# Patient Record
Sex: MALE | Race: OTHER | NOT HISPANIC OR LATINO | URBAN - METROPOLITAN AREA
[De-identification: names, ages, dates, MRNs, and addresses within clinical notes are randomized per-mention and may not be internally consistent; named-entity substitution may affect disease eponyms.]

---

## 2018-01-31 ENCOUNTER — EMERGENCY (EMERGENCY)
Facility: HOSPITAL | Age: 63
LOS: 1 days | Discharge: HOME | End: 2018-01-31
Attending: EMERGENCY MEDICINE | Admitting: EMERGENCY MEDICINE
Payer: COMMERCIAL

## 2018-01-31 VITALS
OXYGEN SATURATION: 98 % | SYSTOLIC BLOOD PRESSURE: 165 MMHG | TEMPERATURE: 98 F | DIASTOLIC BLOOD PRESSURE: 110 MMHG | RESPIRATION RATE: 18 BRPM | HEART RATE: 97 BPM

## 2018-01-31 VITALS
DIASTOLIC BLOOD PRESSURE: 100 MMHG | SYSTOLIC BLOOD PRESSURE: 173 MMHG | RESPIRATION RATE: 20 BRPM | HEART RATE: 94 BPM | OXYGEN SATURATION: 99 %

## 2018-01-31 LAB
ALBUMIN SERPL ELPH-MCNC: 4.4 G/DL — SIGNIFICANT CHANGE UP (ref 3.3–5)
ALP SERPL-CCNC: 37 U/L — LOW (ref 40–120)
ALT FLD-CCNC: 33 U/L — SIGNIFICANT CHANGE UP (ref 4–41)
APPEARANCE UR: CLEAR — SIGNIFICANT CHANGE UP
AST SERPL-CCNC: 32 U/L — SIGNIFICANT CHANGE UP (ref 4–40)
BASOPHILS # BLD AUTO: 0.05 K/UL — SIGNIFICANT CHANGE UP (ref 0–0.2)
BASOPHILS NFR BLD AUTO: 0.9 % — SIGNIFICANT CHANGE UP (ref 0–2)
BILIRUB SERPL-MCNC: 0.5 MG/DL — SIGNIFICANT CHANGE UP (ref 0.2–1.2)
BILIRUB UR-MCNC: NEGATIVE — SIGNIFICANT CHANGE UP
BLOOD UR QL VISUAL: HIGH
BUN SERPL-MCNC: 15 MG/DL — SIGNIFICANT CHANGE UP (ref 7–23)
CALCIUM SERPL-MCNC: 8.6 MG/DL — SIGNIFICANT CHANGE UP (ref 8.4–10.5)
CHLORIDE SERPL-SCNC: 100 MMOL/L — SIGNIFICANT CHANGE UP (ref 98–107)
CK MB BLD-MCNC: 1.25 NG/ML — SIGNIFICANT CHANGE UP (ref 1–6.6)
CK MB BLD-MCNC: SIGNIFICANT CHANGE UP (ref 0–2.5)
CK SERPL-CCNC: 140 U/L — SIGNIFICANT CHANGE UP (ref 30–200)
CO2 SERPL-SCNC: 23 MMOL/L — SIGNIFICANT CHANGE UP (ref 22–31)
COLOR SPEC: YELLOW — SIGNIFICANT CHANGE UP
CREAT SERPL-MCNC: 0.98 MG/DL — SIGNIFICANT CHANGE UP (ref 0.5–1.3)
EOSINOPHIL # BLD AUTO: 0.17 K/UL — SIGNIFICANT CHANGE UP (ref 0–0.5)
EOSINOPHIL NFR BLD AUTO: 2.9 % — SIGNIFICANT CHANGE UP (ref 0–6)
GLUCOSE SERPL-MCNC: 93 MG/DL — SIGNIFICANT CHANGE UP (ref 70–99)
GLUCOSE UR-MCNC: NEGATIVE — SIGNIFICANT CHANGE UP
HCT VFR BLD CALC: 49.8 % — SIGNIFICANT CHANGE UP (ref 39–50)
HGB BLD-MCNC: 17 G/DL — SIGNIFICANT CHANGE UP (ref 13–17)
IMM GRANULOCYTES # BLD AUTO: 0.02 # — SIGNIFICANT CHANGE UP
IMM GRANULOCYTES NFR BLD AUTO: 0.3 % — SIGNIFICANT CHANGE UP (ref 0–1.5)
KETONES UR-MCNC: NEGATIVE — SIGNIFICANT CHANGE UP
LEUKOCYTE ESTERASE UR-ACNC: NEGATIVE — SIGNIFICANT CHANGE UP
LYMPHOCYTES # BLD AUTO: 1.42 K/UL — SIGNIFICANT CHANGE UP (ref 1–3.3)
LYMPHOCYTES # BLD AUTO: 24.5 % — SIGNIFICANT CHANGE UP (ref 13–44)
MCHC RBC-ENTMCNC: 31.8 PG — SIGNIFICANT CHANGE UP (ref 27–34)
MCHC RBC-ENTMCNC: 34.1 % — SIGNIFICANT CHANGE UP (ref 32–36)
MCV RBC AUTO: 93.3 FL — SIGNIFICANT CHANGE UP (ref 80–100)
MONOCYTES # BLD AUTO: 1.09 K/UL — HIGH (ref 0–0.9)
MONOCYTES NFR BLD AUTO: 18.8 % — HIGH (ref 2–14)
MUCOUS THREADS # UR AUTO: SIGNIFICANT CHANGE UP
NEUTROPHILS # BLD AUTO: 3.04 K/UL — SIGNIFICANT CHANGE UP (ref 1.8–7.4)
NEUTROPHILS NFR BLD AUTO: 52.6 % — SIGNIFICANT CHANGE UP (ref 43–77)
NITRITE UR-MCNC: NEGATIVE — SIGNIFICANT CHANGE UP
NRBC # FLD: 0 — SIGNIFICANT CHANGE UP
PH UR: 6 — SIGNIFICANT CHANGE UP (ref 4.6–8)
PLATELET # BLD AUTO: 199 K/UL — SIGNIFICANT CHANGE UP (ref 150–400)
PMV BLD: 11.3 FL — SIGNIFICANT CHANGE UP (ref 7–13)
POTASSIUM SERPL-MCNC: 5 MMOL/L — SIGNIFICANT CHANGE UP (ref 3.5–5.3)
POTASSIUM SERPL-SCNC: 5 MMOL/L — SIGNIFICANT CHANGE UP (ref 3.5–5.3)
PROT SERPL-MCNC: 7.5 G/DL — SIGNIFICANT CHANGE UP (ref 6–8.3)
PROT UR-MCNC: 10 MG/DL — SIGNIFICANT CHANGE UP
RBC # BLD: 5.34 M/UL — SIGNIFICANT CHANGE UP (ref 4.2–5.8)
RBC # FLD: 12.2 % — SIGNIFICANT CHANGE UP (ref 10.3–14.5)
RBC CASTS # UR COMP ASSIST: HIGH (ref 0–?)
SODIUM SERPL-SCNC: 138 MMOL/L — SIGNIFICANT CHANGE UP (ref 135–145)
SP GR SPEC: 1.02 — SIGNIFICANT CHANGE UP (ref 1–1.04)
SQUAMOUS # UR AUTO: SIGNIFICANT CHANGE UP
TROPONIN T SERPL-MCNC: < 0.06 NG/ML — SIGNIFICANT CHANGE UP (ref 0–0.06)
UROBILINOGEN FLD QL: NORMAL MG/DL — SIGNIFICANT CHANGE UP
WBC # BLD: 5.94 K/UL — SIGNIFICANT CHANGE UP (ref 3.8–10.5)
WBC # FLD AUTO: 5.94 K/UL — SIGNIFICANT CHANGE UP (ref 3.8–10.5)
WBC UR QL: SIGNIFICANT CHANGE UP (ref 0–?)

## 2018-01-31 PROCEDURE — 99285 EMERGENCY DEPT VISIT HI MDM: CPT

## 2018-01-31 PROCEDURE — 71046 X-RAY EXAM CHEST 2 VIEWS: CPT | Mod: 26

## 2018-01-31 NOTE — ED PROVIDER NOTE - PMH
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

## 2018-01-31 NOTE — ED PROVIDER NOTE - OBJECTIVE STATEMENT
62M pmh htn, PE, pna p/w 2-3 days of "hacking cough" and yellow/green sputum.  Pt went to urgent care last night who noted a fever to 101, tachycardia and elevated BP as well as blood in the urine and urged the patient to go to the ER last night.  Pt felt they did nothing for his cough, but he went home and then decided to come to the ER today.  States continued nagging cough, worse at night.  Pain in chest and back when he coughs.  No pain at rest.  denies abd pain, nausea/vomiting/diarrhea, chills, dysuria, rash.  Pt states he has no shortness of breath and walked 20 blocks this morning to get to the ER.  [facility]

## 2018-01-31 NOTE — ED PROVIDER NOTE - PLAN OF CARE
- A prescription has been sent to your pharmacy - please take as directed.  Keep out of reach of children.  - Please follow up with your Primary MD in 1-2 days.  You should have your blood pressure checked again due to high BP in the ER.    - A list of urologists is provided to follow up with.  Please schedule an appointment to follow up about the blood in your urine.

## 2018-01-31 NOTE — ED PROVIDER NOTE - MEDICAL DECISION MAKING DETAILS
62M hx htn, pe p/w "hacking cough", yellow sputum production and fever.  R/o pneumonia.  Will obtain cxr, labs, ekg, and reassess.  - Silvia Merrill DO

## 2018-01-31 NOTE — ED ADULT TRIAGE NOTE - CHIEF COMPLAINT QUOTE
pt presents sent by Urgent Care for evaluation of high blood pressure, elevated HR, abnormal ekg, abnormal labs, elevated sugar, and hematuria. pt reports he went to urgent care for a productive cough x 2-3 days and was sent for "moderate amount of blood in urine" denies urinary symptoms. endorses taking 81mg asa daily. PMHX: htn, PE, pna  FS: 92

## 2018-01-31 NOTE — ED PROVIDER NOTE - PHYSICAL EXAMINATION
Gen: NAD, AOx3, well appearing, occasional hacking cough  Head: NCAT  HEENT: PERRL, oral mucosa moist, normal conjunctiva, no throat erythema or exudates, no LAD  Lung: CTAB, but splinting and profuse coughing on exam, no wheezing, no respiratory distress  CV: rrr, no murmurs, Normal perfusion  Abd: soft, NTND, no CVA tenderness  MSK: No edema, no visible deformities  Neuro: No focal neurologic deficits  Skin: No rash   Psych: normal affect   - Silvia Merrill, DO

## 2018-01-31 NOTE — ED PROVIDER NOTE - FAMILY HISTORY
Mother  Still living? Unknown  Family history of diabetes mellitus, Age at diagnosis: Age Unknown     Father  Still living? Unknown  Family history of colitis, Age at diagnosis: Age Unknown     Grandparent  Still living? Unknown  Family history of stroke, Age at diagnosis: Age Unknown

## 2018-01-31 NOTE — ED PROVIDER NOTE - CARE PLAN
Principal Discharge DX:	Cough  Assessment and plan of treatment:	- A prescription has been sent to your pharmacy - please take as directed.  Keep out of reach of children.  - Please follow up with your Primary MD in 1-2 days.  You should have your blood pressure checked again due to high BP in the ER.    - A list of urologists is provided to follow up with.  Please schedule an appointment to follow up about the blood in your urine.

## 2018-01-31 NOTE — ED PROVIDER NOTE - ATTENDING CONTRIBUTION TO CARE
62m, pmhx htn, remote PE not on a/c. has had fever, loss of voice and cough x 2-3 days. went to  who sent him to ED for unclear reasons. has chest congestion but no rhinorrhea, body aches or h/a. feels like previous bronchitis but went to  to r/o pna. over there they also found microscopic hematuria. pt has no urinary complaints. he reports fever last night. exam, htn, other vs wnl, nad. hs and lungs normal, abdo benign, legs and pulses normal. cxr normal. trace blood on ua. labs normal. pt with viral bronchitis, will d/c. f/u with uro for the hematuria. he has no other urinary symptoms or flank pain.

## 2018-02-01 LAB
BACTERIA UR CULT: SIGNIFICANT CHANGE UP
SPECIMEN SOURCE: SIGNIFICANT CHANGE UP

## 2019-01-01 ENCOUNTER — EMERGENCY (EMERGENCY)
Facility: HOSPITAL | Age: 64
LOS: 1 days | Discharge: HOME | End: 2019-01-01
Attending: EMERGENCY MEDICINE | Admitting: EMERGENCY MEDICINE
Payer: COMMERCIAL

## 2019-01-01 VITALS
TEMPERATURE: 98 F | RESPIRATION RATE: 16 BRPM | DIASTOLIC BLOOD PRESSURE: 88 MMHG | OXYGEN SATURATION: 99 % | HEART RATE: 68 BPM | SYSTOLIC BLOOD PRESSURE: 148 MMHG

## 2019-01-01 VITALS
TEMPERATURE: 99 F | DIASTOLIC BLOOD PRESSURE: 98 MMHG | HEART RATE: 105 BPM | SYSTOLIC BLOOD PRESSURE: 156 MMHG | OXYGEN SATURATION: 98 % | RESPIRATION RATE: 17 BRPM

## 2019-01-01 LAB
ALBUMIN SERPL ELPH-MCNC: 4.1 G/DL — SIGNIFICANT CHANGE UP (ref 3.3–5)
ALP SERPL-CCNC: 44 U/L — SIGNIFICANT CHANGE UP (ref 40–120)
ALT FLD-CCNC: 25 U/L — SIGNIFICANT CHANGE UP (ref 4–41)
ANION GAP SERPL CALC-SCNC: 14 MMO/L — SIGNIFICANT CHANGE UP (ref 7–14)
ANISOCYTOSIS BLD QL: SIGNIFICANT CHANGE UP
APTT BLD: 27.9 SEC — SIGNIFICANT CHANGE UP (ref 27.5–36.3)
AST SERPL-CCNC: 20 U/L — SIGNIFICANT CHANGE UP (ref 4–40)
BASOPHILS # BLD AUTO: 0.05 K/UL — SIGNIFICANT CHANGE UP (ref 0–0.2)
BASOPHILS NFR BLD AUTO: 0.7 % — SIGNIFICANT CHANGE UP (ref 0–2)
BASOPHILS NFR SPEC: 1.7 % — SIGNIFICANT CHANGE UP (ref 0–2)
BILIRUB SERPL-MCNC: 0.6 MG/DL — SIGNIFICANT CHANGE UP (ref 0.2–1.2)
BLASTS # FLD: 0 % — SIGNIFICANT CHANGE UP (ref 0–0)
BUN SERPL-MCNC: 14 MG/DL — SIGNIFICANT CHANGE UP (ref 7–23)
CALCIUM SERPL-MCNC: 9 MG/DL — SIGNIFICANT CHANGE UP (ref 8.4–10.5)
CHLORIDE SERPL-SCNC: 98 MMOL/L — SIGNIFICANT CHANGE UP (ref 98–107)
CK MB BLD-MCNC: < 1 NG/ML — LOW (ref 1–6.6)
CK MB BLD-MCNC: SIGNIFICANT CHANGE UP (ref 0–2.5)
CK SERPL-CCNC: 118 U/L — SIGNIFICANT CHANGE UP (ref 30–200)
CO2 SERPL-SCNC: 25 MMOL/L — SIGNIFICANT CHANGE UP (ref 22–31)
CREAT SERPL-MCNC: 0.94 MG/DL — SIGNIFICANT CHANGE UP (ref 0.5–1.3)
EOSINOPHIL # BLD AUTO: 0.18 K/UL — SIGNIFICANT CHANGE UP (ref 0–0.5)
EOSINOPHIL NFR BLD AUTO: 2.3 % — SIGNIFICANT CHANGE UP (ref 0–6)
EOSINOPHIL NFR FLD: 0.9 % — SIGNIFICANT CHANGE UP (ref 0–6)
GLUCOSE SERPL-MCNC: 119 MG/DL — HIGH (ref 70–99)
HCT VFR BLD CALC: 42.6 % — SIGNIFICANT CHANGE UP (ref 39–50)
HGB BLD-MCNC: 14 G/DL — SIGNIFICANT CHANGE UP (ref 13–17)
IMM GRANULOCYTES NFR BLD AUTO: 0.4 % — SIGNIFICANT CHANGE UP (ref 0–1.5)
INR BLD: 1.34 — HIGH (ref 0.88–1.17)
LYMPHOCYTES # BLD AUTO: 1.29 K/UL — SIGNIFICANT CHANGE UP (ref 1–3.3)
LYMPHOCYTES # BLD AUTO: 16.8 % — SIGNIFICANT CHANGE UP (ref 13–44)
LYMPHOCYTES NFR SPEC AUTO: 8.7 % — LOW (ref 13–44)
MCHC RBC-ENTMCNC: 30.6 PG — SIGNIFICANT CHANGE UP (ref 27–34)
MCHC RBC-ENTMCNC: 32.9 % — SIGNIFICANT CHANGE UP (ref 32–36)
MCV RBC AUTO: 93.2 FL — SIGNIFICANT CHANGE UP (ref 80–100)
METAMYELOCYTES # FLD: 0 % — SIGNIFICANT CHANGE UP (ref 0–1)
MONOCYTES # BLD AUTO: 1.72 K/UL — HIGH (ref 0–0.9)
MONOCYTES NFR BLD AUTO: 22.4 % — HIGH (ref 2–14)
MONOCYTES NFR BLD: 20.9 % — HIGH (ref 2–9)
MYELOCYTES NFR BLD: 0 % — SIGNIFICANT CHANGE UP (ref 0–0)
NEUTROPHIL AB SER-ACNC: 59.1 % — SIGNIFICANT CHANGE UP (ref 43–77)
NEUTROPHILS # BLD AUTO: 4.41 K/UL — SIGNIFICANT CHANGE UP (ref 1.8–7.4)
NEUTROPHILS NFR BLD AUTO: 57.4 % — SIGNIFICANT CHANGE UP (ref 43–77)
NEUTS BAND # BLD: 3.5 % — SIGNIFICANT CHANGE UP (ref 0–6)
NRBC # FLD: 0 K/UL — SIGNIFICANT CHANGE UP (ref 0–0)
OTHER - HEMATOLOGY %: 0 — SIGNIFICANT CHANGE UP
OVALOCYTES BLD QL SMEAR: SIGNIFICANT CHANGE UP
PLATELET # BLD AUTO: 127 K/UL — LOW (ref 150–400)
PLATELET COUNT - ESTIMATE: SIGNIFICANT CHANGE UP
PMV BLD: 12 FL — SIGNIFICANT CHANGE UP (ref 7–13)
POIKILOCYTOSIS BLD QL AUTO: SIGNIFICANT CHANGE UP
POLYCHROMASIA BLD QL SMEAR: SIGNIFICANT CHANGE UP
POTASSIUM SERPL-MCNC: 3.5 MMOL/L — SIGNIFICANT CHANGE UP (ref 3.5–5.3)
POTASSIUM SERPL-SCNC: 3.5 MMOL/L — SIGNIFICANT CHANGE UP (ref 3.5–5.3)
PROMYELOCYTES # FLD: 0 % — SIGNIFICANT CHANGE UP (ref 0–0)
PROT SERPL-MCNC: 7.2 G/DL — SIGNIFICANT CHANGE UP (ref 6–8.3)
PROTHROM AB SERPL-ACNC: 15.4 SEC — HIGH (ref 9.8–13.1)
RBC # BLD: 4.57 M/UL — SIGNIFICANT CHANGE UP (ref 4.2–5.8)
RBC # FLD: 12.4 % — SIGNIFICANT CHANGE UP (ref 10.3–14.5)
SODIUM SERPL-SCNC: 137 MMOL/L — SIGNIFICANT CHANGE UP (ref 135–145)
VARIANT LYMPHS # BLD: 5.2 % — SIGNIFICANT CHANGE UP
WBC # BLD: 7.68 K/UL — SIGNIFICANT CHANGE UP (ref 3.8–10.5)
WBC # FLD AUTO: 7.68 K/UL — SIGNIFICANT CHANGE UP (ref 3.8–10.5)

## 2019-01-01 PROCEDURE — 73700 CT LOWER EXTREMITY W/O DYE: CPT | Mod: 26,RT

## 2019-01-01 PROCEDURE — 72192 CT PELVIS W/O DYE: CPT | Mod: 26

## 2019-01-01 PROCEDURE — 99285 EMERGENCY DEPT VISIT HI MDM: CPT

## 2019-01-01 RX ORDER — LIDOCAINE 4 G/100G
1 CREAM TOPICAL
Qty: 5 | Refills: 0
Start: 2019-01-01

## 2019-01-01 RX ORDER — MORPHINE SULFATE 50 MG/1
4 CAPSULE, EXTENDED RELEASE ORAL ONCE
Refills: 0 | Status: DISCONTINUED | OUTPATIENT
Start: 2019-01-01 | End: 2019-01-01

## 2019-01-01 RX ORDER — ACETAMINOPHEN 500 MG
975 TABLET ORAL ONCE
Refills: 0 | Status: COMPLETED | OUTPATIENT
Start: 2019-01-01 | End: 2019-01-01

## 2019-01-01 RX ORDER — LIDOCAINE 4 G/100G
1 CREAM TOPICAL ONCE
Refills: 0 | Status: COMPLETED | OUTPATIENT
Start: 2019-01-01 | End: 2019-01-01

## 2019-01-01 RX ORDER — OXYCODONE HYDROCHLORIDE 5 MG/1
1 TABLET ORAL
Qty: 9 | Refills: 0
Start: 2019-01-01

## 2019-01-01 RX ADMIN — LIDOCAINE 1 PATCH: 4 CREAM TOPICAL at 00:29

## 2019-01-01 RX ADMIN — MORPHINE SULFATE 4 MILLIGRAM(S): 50 CAPSULE, EXTENDED RELEASE ORAL at 01:53

## 2019-01-01 RX ADMIN — Medication 975 MILLIGRAM(S): at 00:29

## 2019-11-01 NOTE — ED PROVIDER NOTE - CLINICAL SUMMARY MEDICAL DECISION MAKING FREE TEXT BOX
Jamie: 63 M, lymphoma, a few days ago twisted an felt/heard snap in R hip, then pain. Failed bed rest and Tylenol. TTP R hip. NVI. Concern for pathologic fx. Can't bear weight. Plan: xray, pain control, CT.

## 2019-11-01 NOTE — ED ADULT TRIAGE NOTE - CHIEF COMPLAINT QUOTE
Pt walk in with Relatives. c/o Right Hip pain for 3 days. Unable to Bear weight. PMHx Non-Hodgkin's on Chemo pill everyday/ Chemo injection every Friday.   Denies Trauma weakness numbness on affected area.

## 2019-11-01 NOTE — ED ADULT NURSE NOTE - NSIMPLEMENTINTERV_GEN_ALL_ED
Implemented All Fall with Harm Risk Interventions:  [place] to call system. Call bell, personal items and telephone within reach. Instruct patient to call for assistance. Room bathroom lighting operational. Non-slip footwear when patient is off stretcher. Physically safe environment: no spills, clutter or unnecessary equipment. Stretcher in lowest position, wheels locked, appropriate side rails in place. Provide visual cue, wrist band, yellow gown, etc. Monitor gait and stability. Monitor for mental status changes and reorient to person, place, and time. Review medications for side effects contributing to fall risk. Reinforce activity limits and safety measures with patient and family. Provide visual clues: red socks.

## 2019-11-01 NOTE — ED PROVIDER NOTE - PHYSICAL EXAMINATION
Gen: NAD, non-toxic, conversational  Eyes: PERRLA, EOMI   HENT: Normocephalic, atraumatic. External ears normal, no rhinorrhea, moist mucous membranes.   CV: RRR, no M/R/G  Resp: CTAB, non-labored, speaking without difficulty on room air  Abd: soft, non tender, non rigid, no guarding or rebound tenderness  Back: No CVAT bilaterally, no midline ttp  Skin: dry, wwp   MSK: RLE with ttp overlying the posterior aspect of the greater trochanter   Neuro: AOx3, speech is fluent and appropriate  Psych: Mood concerned, affect euthymic

## 2019-11-01 NOTE — ED PROVIDER NOTE - PROGRESS NOTE DETAILS
Abdullahi VIVEROS: Pt reports improvement in pain.  Labs are unremarkable and CT negative for acute fracture or dislocation.  Pt feels comfortable with continued outpatient pain control and understands to follow-up with PMD for further management if hip pain continues.  Pt is stable for discharge home.

## 2019-11-01 NOTE — ED PROVIDER NOTE - PATIENT PORTAL LINK FT
You can access the FollowMyHealth Patient Portal offered by [facility] by registering at the following website: [email or web address]. By joining [facility]’s FollowMyHealth portal, you will also be able to view your health information using other applications (apps) compatible with our system.

## 2019-11-01 NOTE — ED ADULT NURSE NOTE - OBJECTIVE STATEMENT
62 y/o M received to room 2 c/o R hip pain.  Pt a&ox4 and unable to ambulate due to pain.  Pt states pain started Wednesday. Pt endorse pain with abduction and adduction legs equal in length. Pt has no ROM in R leg and is tender to move. Pedal and post tibial pulses +2 bilaterally. Area not warm to touch and no redness noted.  Pt denies fall, trauma to the area, fevers or chills.  Pt respirations even and unlabored.  Pt denies any opioids for pain.  Vital signs as noted, MD evaluated comfort measures provided. will continue to monitor for comfort and safety.

## 2019-11-01 NOTE — ED PROVIDER NOTE - NSFOLLOWUPINSTRUCTIONS_ED_ALL_ED_FT
You were seen in the emergency department for right hip pain.  You had normal blood tests and a CT scan that did not show any acute fracture or dislocation.  Drink plenty of fluids.  You can take Tylenol 650mg every 4 hours as needed for pain.  You were also prescribed oxycodone 5mg to take every 8 hours as needed for any severe, breakthrough pain.  You can also apply a lidocaine patch every 12 hours as needed.  Follow-up with your PMD in 48 hours.  Return to the emergency department for any new or worsening symptoms.

## 2019-11-01 NOTE — ED ADULT NURSE NOTE - CAS EDN DISCHARGE INTERVENTIONS
Patient needs a refill for pro air and advair 230/21    Please send to [facility] at [phone]   IV discontinued, cath removed intact

## 2019-11-01 NOTE — ED PROVIDER NOTE - ATTENDING CONTRIBUTION TO CARE
I performed a face-to-face evaluation of the patient and performed a history and physical examination. I agree with the history and physical examination.    Jamie: 63 M, lymphoma, a few days ago twisted an felt/heard snap in R hip, then pain. Failed bed rest and Tylenol. TTP R hip. NVI. Concern for pathologic fx. Can't bear weight. Plan: xray, pain control, CT.

## 2019-11-01 NOTE — ED PROVIDER NOTE - OBJECTIVE STATEMENT
Hx of lymphoma on biologic agents presenting with complaint of hip pain that started 3 days ago after a twisting motion and has progressively worsened. Now unable to bear weight on the RLE. Is painful to the touch, can worsen it by moving his hip, no hx of this in the past, was told that his medications can make his bones more brittle. Has no hx of osteoporosis. Has had traumatic fractures in the past but denies any trauma.

## 2019-11-02 NOTE — ED ADULT NURSE REASSESSMENT NOTE - NS ED NURSE REASSESS COMMENT FT1
pain reassessment as noted in flow sheet. pt states he feels comfortable and doesn't want any more pain medication. No complaints of chest pain, headache, nausea, dizziness, vomiting  SOB, fever, chills verbalized..

## 2019-11-02 NOTE — ED ADULT NURSE REASSESSMENT NOTE - NS ED NURSE REASSESS COMMENT FT1
received report from sammie MUIR. Pt is a/o x 3.  pt c/o right hip pain. dr haynes made aware and will order additional medication. no complaints of chest pain, headache, nausea, dizzniness, vomiting, SOB, fever, chills   verbalized. Pt has 20g iv placed to right AC with no redness or swelling noted. Awaiting further orders. Will continue to monitor.

## 2020-01-01 ENCOUNTER — INPATIENT (INPATIENT)
Facility: HOSPITAL | Age: 65
LOS: 11 days | End: 2020-04-26
Attending: HOSPITALIST | Admitting: HOSPITALIST
Payer: COMMERCIAL

## 2020-01-01 ENCOUNTER — INPATIENT (INPATIENT)
Facility: HOSPITAL | Age: 65
LOS: 0 days | Discharge: ANOTHER HOSPITAL | DRG: 304 | End: 2020-04-14
Attending: INTERNAL MEDICINE | Admitting: INTERNAL MEDICINE
Payer: COMMERCIAL

## 2020-01-01 VITALS — DIASTOLIC BLOOD PRESSURE: 73 MMHG | TEMPERATURE: 98 F | SYSTOLIC BLOOD PRESSURE: 122 MMHG | HEART RATE: 65 BPM

## 2020-01-01 VITALS
SYSTOLIC BLOOD PRESSURE: 105 MMHG | OXYGEN SATURATION: 93 % | TEMPERATURE: 101 F | HEIGHT: 73 IN | RESPIRATION RATE: 18 BRPM | HEART RATE: 70 BPM | WEIGHT: 220.46 LBS | DIASTOLIC BLOOD PRESSURE: 57 MMHG

## 2020-01-01 VITALS
SYSTOLIC BLOOD PRESSURE: 123 MMHG | HEIGHT: 73 IN | OXYGEN SATURATION: 97 % | TEMPERATURE: 101 F | WEIGHT: 220.02 LBS | DIASTOLIC BLOOD PRESSURE: 81 MMHG | HEART RATE: 71 BPM | RESPIRATION RATE: 22 BRPM

## 2020-01-01 VITALS — TEMPERATURE: 97 F

## 2020-01-01 DIAGNOSIS — D61.818 OTHER PANCYTOPENIA: ICD-10-CM

## 2020-01-01 DIAGNOSIS — E87.1 HYPO-OSMOLALITY AND HYPONATREMIA: ICD-10-CM

## 2020-01-01 DIAGNOSIS — Z71.89 OTHER SPECIFIED COUNSELING: ICD-10-CM

## 2020-01-01 DIAGNOSIS — U07.1 COVID-19: ICD-10-CM

## 2020-01-01 DIAGNOSIS — A09 INFECTIOUS GASTROENTERITIS AND COLITIS, UNSPECIFIED: ICD-10-CM

## 2020-01-01 DIAGNOSIS — I10 ESSENTIAL (PRIMARY) HYPERTENSION: ICD-10-CM

## 2020-01-01 DIAGNOSIS — J96.91 RESPIRATORY FAILURE, UNSPECIFIED WITH HYPOXIA: ICD-10-CM

## 2020-01-01 DIAGNOSIS — E11.9 TYPE 2 DIABETES MELLITUS WITHOUT COMPLICATIONS: ICD-10-CM

## 2020-01-01 DIAGNOSIS — J18.9 PNEUMONIA, UNSPECIFIED ORGANISM: ICD-10-CM

## 2020-01-01 DIAGNOSIS — C83.30 DIFFUSE LARGE B-CELL LYMPHOMA, UNSPECIFIED SITE: ICD-10-CM

## 2020-01-01 DIAGNOSIS — E87.6 HYPOKALEMIA: ICD-10-CM

## 2020-01-01 DIAGNOSIS — C85.90 NON-HODGKIN LYMPHOMA, UNSPECIFIED, UNSPECIFIED SITE: ICD-10-CM

## 2020-01-01 DIAGNOSIS — A41.9 SEPSIS, UNSPECIFIED ORGANISM: ICD-10-CM

## 2020-01-01 DIAGNOSIS — Z29.9 ENCOUNTER FOR PROPHYLACTIC MEASURES, UNSPECIFIED: ICD-10-CM

## 2020-01-01 DIAGNOSIS — J96.00 ACUTE RESPIRATORY FAILURE, UNSPECIFIED WHETHER WITH HYPOXIA OR HYPERCAPNIA: ICD-10-CM

## 2020-01-01 DIAGNOSIS — G93.40 ENCEPHALOPATHY, UNSPECIFIED: ICD-10-CM

## 2020-01-01 LAB
ALBUMIN SERPL ELPH-MCNC: 3 G/DL — LOW (ref 3.3–5)
ALBUMIN SERPL ELPH-MCNC: 3 G/DL — LOW (ref 3.5–5)
ALBUMIN SERPL ELPH-MCNC: 3.2 G/DL — LOW (ref 3.3–5)
ALBUMIN SERPL ELPH-MCNC: 3.3 G/DL — SIGNIFICANT CHANGE UP (ref 3.3–5)
ALBUMIN SERPL ELPH-MCNC: 3.3 G/DL — SIGNIFICANT CHANGE UP (ref 3.3–5)
ALP SERPL-CCNC: 103 U/L — SIGNIFICANT CHANGE UP (ref 40–120)
ALP SERPL-CCNC: 116 U/L — SIGNIFICANT CHANGE UP (ref 40–120)
ALP SERPL-CCNC: 31 U/L — LOW (ref 40–120)
ALP SERPL-CCNC: 32 U/L — LOW (ref 40–120)
ALP SERPL-CCNC: 47 U/L — SIGNIFICANT CHANGE UP (ref 40–120)
ALT FLD-CCNC: 23 U/L — SIGNIFICANT CHANGE UP (ref 4–41)
ALT FLD-CCNC: 30 U/L DA — SIGNIFICANT CHANGE UP (ref 10–60)
ALT FLD-CCNC: 44 U/L — HIGH (ref 4–41)
ALT FLD-CCNC: 51 U/L — HIGH (ref 4–41)
ALT FLD-CCNC: 52 U/L — HIGH (ref 4–41)
ANION GAP SERPL CALC-SCNC: 10 MMOL/L — SIGNIFICANT CHANGE UP (ref 5–17)
ANION GAP SERPL CALC-SCNC: 15 MMO/L — HIGH (ref 7–14)
ANION GAP SERPL CALC-SCNC: 15 MMO/L — HIGH (ref 7–14)
ANION GAP SERPL CALC-SCNC: 16 MMO/L — HIGH (ref 7–14)
ANION GAP SERPL CALC-SCNC: 17 MMO/L — HIGH (ref 7–14)
ANION GAP SERPL CALC-SCNC: 17 MMO/L — HIGH (ref 7–14)
ANION GAP SERPL CALC-SCNC: 18 MMO/L — HIGH (ref 7–14)
ANION GAP SERPL CALC-SCNC: 18 MMO/L — HIGH (ref 7–14)
ANION GAP SERPL CALC-SCNC: 21 MMO/L — HIGH (ref 7–14)
APPEARANCE UR: CLEAR — SIGNIFICANT CHANGE UP
AST SERPL-CCNC: 39 U/L — SIGNIFICANT CHANGE UP (ref 4–40)
AST SERPL-CCNC: 40 U/L — SIGNIFICANT CHANGE UP (ref 10–40)
AST SERPL-CCNC: 75 U/L — HIGH (ref 4–40)
AST SERPL-CCNC: 84 U/L — HIGH (ref 4–40)
AST SERPL-CCNC: 85 U/L — HIGH (ref 4–40)
BACTERIA # UR AUTO: NEGATIVE — SIGNIFICANT CHANGE UP
BASE EXCESS BLDA CALC-SCNC: -3.8 MMOL/L — SIGNIFICANT CHANGE UP
BASOPHILS # BLD AUTO: 0 K/UL — SIGNIFICANT CHANGE UP (ref 0–0.2)
BASOPHILS # BLD AUTO: 0 K/UL — SIGNIFICANT CHANGE UP (ref 0–0.2)
BASOPHILS # BLD AUTO: 0.01 K/UL — SIGNIFICANT CHANGE UP (ref 0–0.2)
BASOPHILS # BLD AUTO: 0.02 K/UL — SIGNIFICANT CHANGE UP (ref 0–0.2)
BASOPHILS # BLD AUTO: 0.02 K/UL — SIGNIFICANT CHANGE UP (ref 0–0.2)
BASOPHILS # BLD AUTO: 0.04 K/UL — SIGNIFICANT CHANGE UP (ref 0–0.2)
BASOPHILS NFR BLD AUTO: 0 % — SIGNIFICANT CHANGE UP (ref 0–2)
BASOPHILS NFR BLD AUTO: 0 % — SIGNIFICANT CHANGE UP (ref 0–2)
BASOPHILS NFR BLD AUTO: 0.1 % — SIGNIFICANT CHANGE UP (ref 0–2)
BASOPHILS NFR BLD AUTO: 0.2 % — SIGNIFICANT CHANGE UP (ref 0–2)
BASOPHILS NFR BLD AUTO: 0.3 % — SIGNIFICANT CHANGE UP (ref 0–2)
BASOPHILS NFR BLD AUTO: 0.3 % — SIGNIFICANT CHANGE UP (ref 0–2)
BASOPHILS NFR BLD AUTO: 0.6 % — SIGNIFICANT CHANGE UP (ref 0–2)
BASOPHILS NFR BLD AUTO: 1.9 % — SIGNIFICANT CHANGE UP (ref 0–2)
BILIRUB SERPL-MCNC: 0.5 MG/DL — SIGNIFICANT CHANGE UP (ref 0.2–1.2)
BILIRUB SERPL-MCNC: 0.7 MG/DL — SIGNIFICANT CHANGE UP (ref 0.2–1.2)
BILIRUB SERPL-MCNC: 0.7 MG/DL — SIGNIFICANT CHANGE UP (ref 0.2–1.2)
BILIRUB UR-MCNC: NEGATIVE — SIGNIFICANT CHANGE UP
BLOOD UR QL VISUAL: SIGNIFICANT CHANGE UP
BUN SERPL-MCNC: 14 MG/DL — SIGNIFICANT CHANGE UP (ref 7–18)
BUN SERPL-MCNC: 15 MG/DL — SIGNIFICANT CHANGE UP (ref 7–23)
BUN SERPL-MCNC: 15 MG/DL — SIGNIFICANT CHANGE UP (ref 7–23)
BUN SERPL-MCNC: 16 MG/DL — SIGNIFICANT CHANGE UP (ref 7–23)
BUN SERPL-MCNC: 16 MG/DL — SIGNIFICANT CHANGE UP (ref 7–23)
BUN SERPL-MCNC: 19 MG/DL — SIGNIFICANT CHANGE UP (ref 7–23)
BUN SERPL-MCNC: 20 MG/DL — SIGNIFICANT CHANGE UP (ref 7–23)
BUN SERPL-MCNC: 25 MG/DL — HIGH (ref 7–23)
BUN SERPL-MCNC: 35 MG/DL — HIGH (ref 7–23)
BUN SERPL-MCNC: 35 MG/DL — HIGH (ref 7–23)
BUN SERPL-MCNC: 38 MG/DL — HIGH (ref 7–23)
BUN SERPL-MCNC: 39 MG/DL — HIGH (ref 7–23)
C DIFF TOX GENS STL QL NAA+PROBE: SIGNIFICANT CHANGE UP
CALCIUM SERPL-MCNC: 7.9 MG/DL — LOW (ref 8.4–10.5)
CALCIUM SERPL-MCNC: 8.6 MG/DL — SIGNIFICANT CHANGE UP (ref 8.4–10.5)
CALCIUM SERPL-MCNC: 8.7 MG/DL — SIGNIFICANT CHANGE UP (ref 8.4–10.5)
CALCIUM SERPL-MCNC: 8.7 MG/DL — SIGNIFICANT CHANGE UP (ref 8.4–10.5)
CALCIUM SERPL-MCNC: 8.8 MG/DL — SIGNIFICANT CHANGE UP (ref 8.4–10.5)
CALCIUM SERPL-MCNC: 8.9 MG/DL — SIGNIFICANT CHANGE UP (ref 8.4–10.5)
CALCIUM SERPL-MCNC: 8.9 MG/DL — SIGNIFICANT CHANGE UP (ref 8.4–10.5)
CALCIUM SERPL-MCNC: 9 MG/DL — SIGNIFICANT CHANGE UP (ref 8.4–10.5)
CALCIUM SERPL-MCNC: 9.1 MG/DL — SIGNIFICANT CHANGE UP (ref 8.4–10.5)
CALCIUM SERPL-MCNC: 9.4 MG/DL — SIGNIFICANT CHANGE UP (ref 8.4–10.5)
CALCIUM SERPL-MCNC: 9.4 MG/DL — SIGNIFICANT CHANGE UP (ref 8.4–10.5)
CALCIUM SERPL-MCNC: 9.5 MG/DL — SIGNIFICANT CHANGE UP (ref 8.4–10.5)
CHLORIDE BLDA-SCNC: 103 MMOL/L — SIGNIFICANT CHANGE UP (ref 96–108)
CHLORIDE SERPL-SCNC: 92 MMOL/L — LOW (ref 98–107)
CHLORIDE SERPL-SCNC: 92 MMOL/L — LOW (ref 98–107)
CHLORIDE SERPL-SCNC: 93 MMOL/L — LOW (ref 98–107)
CHLORIDE SERPL-SCNC: 93 MMOL/L — LOW (ref 98–107)
CHLORIDE SERPL-SCNC: 94 MMOL/L — LOW (ref 98–107)
CHLORIDE SERPL-SCNC: 94 MMOL/L — LOW (ref 98–107)
CHLORIDE SERPL-SCNC: 95 MMOL/L — LOW (ref 98–107)
CHLORIDE SERPL-SCNC: 97 MMOL/L — LOW (ref 98–107)
CHLORIDE SERPL-SCNC: 97 MMOL/L — SIGNIFICANT CHANGE UP (ref 96–108)
CHLORIDE SERPL-SCNC: 99 MMOL/L — SIGNIFICANT CHANGE UP (ref 98–107)
CHLORIDE UR-SCNC: < 20 MMOL/L — SIGNIFICANT CHANGE UP
CO2 SERPL-SCNC: 18 MMOL/L — LOW (ref 22–31)
CO2 SERPL-SCNC: 19 MMOL/L — LOW (ref 22–31)
CO2 SERPL-SCNC: 20 MMOL/L — LOW (ref 22–31)
CO2 SERPL-SCNC: 21 MMOL/L — LOW (ref 22–31)
CO2 SERPL-SCNC: 22 MMOL/L — SIGNIFICANT CHANGE UP (ref 22–31)
CO2 SERPL-SCNC: 23 MMOL/L — SIGNIFICANT CHANGE UP (ref 22–31)
CO2 SERPL-SCNC: 23 MMOL/L — SIGNIFICANT CHANGE UP (ref 22–31)
CO2 SERPL-SCNC: 24 MMOL/L — SIGNIFICANT CHANGE UP (ref 22–31)
CO2 SERPL-SCNC: 25 MMOL/L — SIGNIFICANT CHANGE UP (ref 22–31)
COLOR SPEC: YELLOW — SIGNIFICANT CHANGE UP
CREAT ?TM UR-MCNC: 18.3 MG/DL — SIGNIFICANT CHANGE UP
CREAT SERPL-MCNC: 0.71 MG/DL — SIGNIFICANT CHANGE UP (ref 0.5–1.3)
CREAT SERPL-MCNC: 0.8 MG/DL — SIGNIFICANT CHANGE UP (ref 0.5–1.3)
CREAT SERPL-MCNC: 0.8 MG/DL — SIGNIFICANT CHANGE UP (ref 0.5–1.3)
CREAT SERPL-MCNC: 0.82 MG/DL — SIGNIFICANT CHANGE UP (ref 0.5–1.3)
CREAT SERPL-MCNC: 0.83 MG/DL — SIGNIFICANT CHANGE UP (ref 0.5–1.3)
CREAT SERPL-MCNC: 0.85 MG/DL — SIGNIFICANT CHANGE UP (ref 0.5–1.3)
CREAT SERPL-MCNC: 0.86 MG/DL — SIGNIFICANT CHANGE UP (ref 0.5–1.3)
CREAT SERPL-MCNC: 0.89 MG/DL — SIGNIFICANT CHANGE UP (ref 0.5–1.3)
CREAT SERPL-MCNC: 0.94 MG/DL — SIGNIFICANT CHANGE UP (ref 0.5–1.3)
CREAT SERPL-MCNC: 0.97 MG/DL — SIGNIFICANT CHANGE UP (ref 0.5–1.3)
CREAT SERPL-MCNC: 0.99 MG/DL — SIGNIFICANT CHANGE UP (ref 0.5–1.3)
CREAT SERPL-MCNC: 1.01 MG/DL — SIGNIFICANT CHANGE UP (ref 0.5–1.3)
CRP SERPL-MCNC: 170.9 MG/L — HIGH
CRP SERPL-MCNC: 4.87 MG/DL — HIGH (ref 0–0.4)
CRP SERPL-MCNC: 52.4 MG/L — HIGH
CULTURE RESULTS: SIGNIFICANT CHANGE UP
D DIMER BLD IA.RAPID-MCNC: 184 NG/ML — SIGNIFICANT CHANGE UP
D DIMER BLD IA.RAPID-MCNC: 208 NG/ML DDU — SIGNIFICANT CHANGE UP
D DIMER BLD IA.RAPID-MCNC: 229 NG/ML — SIGNIFICANT CHANGE UP
D DIMER BLD IA.RAPID-MCNC: 252 NG/ML — SIGNIFICANT CHANGE UP
D DIMER BLD IA.RAPID-MCNC: 282 NG/ML — SIGNIFICANT CHANGE UP
D DIMER BLD IA.RAPID-MCNC: 570 NG/ML — SIGNIFICANT CHANGE UP
D DIMER BLD IA.RAPID-MCNC: 927 NG/ML — SIGNIFICANT CHANGE UP
DEPRECATED S PNEUM 1 IGG SER-MCNC: 71.7 MCG/ML — SIGNIFICANT CHANGE UP
DEPRECATED S PNEUM12 IGG SER-MCNC: 9.2 MCG/ML — SIGNIFICANT CHANGE UP
DEPRECATED S PNEUM14 IGG SER-MCNC: 11.8 MCG/ML — SIGNIFICANT CHANGE UP
DEPRECATED S PNEUM17 IGG SER-MCNC: >150 MCG/ML — SIGNIFICANT CHANGE UP
DEPRECATED S PNEUM19 IGG SER-MCNC: 25.2 MCG/ML — SIGNIFICANT CHANGE UP
DEPRECATED S PNEUM19 IGG SER-MCNC: 44.3 MCG/ML — SIGNIFICANT CHANGE UP
DEPRECATED S PNEUM2 IGG SER-MCNC: 35 MCG/ML — SIGNIFICANT CHANGE UP
DEPRECATED S PNEUM20 IGG SER-MCNC: 5.1 MCG/ML — SIGNIFICANT CHANGE UP
DEPRECATED S PNEUM22 IGG SER-MCNC: 9.6 MCG/ML — SIGNIFICANT CHANGE UP
DEPRECATED S PNEUM23 IGG SER-MCNC: 13.3 MCG/ML — SIGNIFICANT CHANGE UP
DEPRECATED S PNEUM3 IGG SER-MCNC: 0.6 MCG/ML — SIGNIFICANT CHANGE UP
DEPRECATED S PNEUM5 IGG SER-MCNC: 7.2 MCG/ML — SIGNIFICANT CHANGE UP
DEPRECATED S PNEUM8 IGG SER-MCNC: 2 MCG/ML — SIGNIFICANT CHANGE UP
DEPRECATED S PNEUM9 IGG SER-MCNC: 1.3 MCG/ML — SIGNIFICANT CHANGE UP
DEPRECATED S PNEUM9 IGG SER-MCNC: 4.6 MCG/ML — SIGNIFICANT CHANGE UP
EOSINOPHIL # BLD AUTO: 0 K/UL — SIGNIFICANT CHANGE UP (ref 0–0.5)
EOSINOPHIL # BLD AUTO: 0.01 K/UL — SIGNIFICANT CHANGE UP (ref 0–0.5)
EOSINOPHIL # BLD AUTO: 0.02 K/UL — SIGNIFICANT CHANGE UP (ref 0–0.5)
EOSINOPHIL # BLD AUTO: 0.06 K/UL — SIGNIFICANT CHANGE UP (ref 0–0.5)
EOSINOPHIL # BLD AUTO: 0.08 K/UL — SIGNIFICANT CHANGE UP (ref 0–0.5)
EOSINOPHIL # BLD AUTO: 0.09 K/UL — SIGNIFICANT CHANGE UP (ref 0–0.5)
EOSINOPHIL NFR BLD AUTO: 0 % — SIGNIFICANT CHANGE UP (ref 0–6)
EOSINOPHIL NFR BLD AUTO: 0.2 % — SIGNIFICANT CHANGE UP (ref 0–6)
EOSINOPHIL NFR BLD AUTO: 0.3 % — SIGNIFICANT CHANGE UP (ref 0–6)
EOSINOPHIL NFR BLD AUTO: 0.8 % — SIGNIFICANT CHANGE UP (ref 0–6)
EOSINOPHIL NFR BLD AUTO: 1.9 % — SIGNIFICANT CHANGE UP (ref 0–6)
EOSINOPHIL NFR BLD AUTO: 2.4 % — SIGNIFICANT CHANGE UP (ref 0–6)
FERRITIN SERPL-MCNC: 1710 NG/ML — HIGH (ref 30–400)
FERRITIN SERPL-MCNC: 2077 NG/ML — HIGH (ref 30–400)
FERRITIN SERPL-MCNC: 2335 NG/ML — HIGH (ref 30–400)
FERRITIN SERPL-MCNC: 2451 NG/ML — HIGH (ref 30–400)
FERRITIN SERPL-MCNC: 2496 NG/ML — HIGH (ref 30–400)
GLUCOSE BLDA-MCNC: 120 MG/DL — HIGH (ref 70–99)
GLUCOSE BLDC GLUCOMTR-MCNC: 100 MG/DL — HIGH (ref 70–99)
GLUCOSE BLDC GLUCOMTR-MCNC: 100 MG/DL — HIGH (ref 70–99)
GLUCOSE BLDC GLUCOMTR-MCNC: 101 MG/DL — HIGH (ref 70–99)
GLUCOSE BLDC GLUCOMTR-MCNC: 101 MG/DL — HIGH (ref 70–99)
GLUCOSE BLDC GLUCOMTR-MCNC: 105 MG/DL — HIGH (ref 70–99)
GLUCOSE BLDC GLUCOMTR-MCNC: 110 MG/DL — HIGH (ref 70–99)
GLUCOSE BLDC GLUCOMTR-MCNC: 116 MG/DL — HIGH (ref 70–99)
GLUCOSE BLDC GLUCOMTR-MCNC: 78 MG/DL — SIGNIFICANT CHANGE UP (ref 70–99)
GLUCOSE BLDC GLUCOMTR-MCNC: 82 MG/DL — SIGNIFICANT CHANGE UP (ref 70–99)
GLUCOSE BLDC GLUCOMTR-MCNC: 83 MG/DL — SIGNIFICANT CHANGE UP (ref 70–99)
GLUCOSE BLDC GLUCOMTR-MCNC: 87 MG/DL — SIGNIFICANT CHANGE UP (ref 70–99)
GLUCOSE BLDC GLUCOMTR-MCNC: 91 MG/DL — SIGNIFICANT CHANGE UP (ref 70–99)
GLUCOSE BLDC GLUCOMTR-MCNC: 92 MG/DL — SIGNIFICANT CHANGE UP (ref 70–99)
GLUCOSE BLDC GLUCOMTR-MCNC: 93 MG/DL — SIGNIFICANT CHANGE UP (ref 70–99)
GLUCOSE BLDC GLUCOMTR-MCNC: 94 MG/DL — SIGNIFICANT CHANGE UP (ref 70–99)
GLUCOSE BLDC GLUCOMTR-MCNC: 96 MG/DL — SIGNIFICANT CHANGE UP (ref 70–99)
GLUCOSE BLDC GLUCOMTR-MCNC: 96 MG/DL — SIGNIFICANT CHANGE UP (ref 70–99)
GLUCOSE BLDC GLUCOMTR-MCNC: 99 MG/DL — SIGNIFICANT CHANGE UP (ref 70–99)
GLUCOSE BLDC GLUCOMTR-MCNC: 99 MG/DL — SIGNIFICANT CHANGE UP (ref 70–99)
GLUCOSE SERPL-MCNC: 100 MG/DL — HIGH (ref 70–99)
GLUCOSE SERPL-MCNC: 104 MG/DL — HIGH (ref 70–99)
GLUCOSE SERPL-MCNC: 108 MG/DL — HIGH (ref 70–99)
GLUCOSE SERPL-MCNC: 109 MG/DL — HIGH (ref 70–99)
GLUCOSE SERPL-MCNC: 120 MG/DL — HIGH (ref 70–99)
GLUCOSE SERPL-MCNC: 131 MG/DL — HIGH (ref 70–99)
GLUCOSE SERPL-MCNC: 171 MG/DL — HIGH (ref 70–99)
GLUCOSE SERPL-MCNC: 82 MG/DL — SIGNIFICANT CHANGE UP (ref 70–99)
GLUCOSE SERPL-MCNC: 85 MG/DL — SIGNIFICANT CHANGE UP (ref 70–99)
GLUCOSE SERPL-MCNC: 87 MG/DL — SIGNIFICANT CHANGE UP (ref 70–99)
GLUCOSE SERPL-MCNC: 92 MG/DL — SIGNIFICANT CHANGE UP (ref 70–99)
GLUCOSE SERPL-MCNC: 98 MG/DL — SIGNIFICANT CHANGE UP (ref 70–99)
GLUCOSE UR-MCNC: NEGATIVE — SIGNIFICANT CHANGE UP
HBA1C BLD-MCNC: 6.3 % — HIGH (ref 4–5.6)
HBA1C BLD-MCNC: SIGNIFICANT CHANGE UP % (ref 4–5.6)
HCO3 BLDA-SCNC: 22 MMOL/L — SIGNIFICANT CHANGE UP (ref 22–26)
HCT VFR BLD CALC: 37.6 % — LOW (ref 39–50)
HCT VFR BLD CALC: 38.8 % — LOW (ref 39–50)
HCT VFR BLD CALC: 39.8 % — SIGNIFICANT CHANGE UP (ref 39–50)
HCT VFR BLD CALC: 39.9 % — SIGNIFICANT CHANGE UP (ref 39–50)
HCT VFR BLD CALC: 40.4 % — SIGNIFICANT CHANGE UP (ref 39–50)
HCT VFR BLD CALC: 41.9 % — SIGNIFICANT CHANGE UP (ref 39–50)
HCT VFR BLD CALC: 42 % — SIGNIFICANT CHANGE UP (ref 39–50)
HCT VFR BLD CALC: 42.8 % — SIGNIFICANT CHANGE UP (ref 39–50)
HCT VFR BLD CALC: 43.1 % — SIGNIFICANT CHANGE UP (ref 39–50)
HCT VFR BLD CALC: 43.5 % — SIGNIFICANT CHANGE UP (ref 39–50)
HCT VFR BLD CALC: 44 % — SIGNIFICANT CHANGE UP (ref 39–50)
HCT VFR BLD CALC: 44.1 % — SIGNIFICANT CHANGE UP (ref 39–50)
HCT VFR BLDA CALC: 46.1 % — SIGNIFICANT CHANGE UP (ref 39–51)
HCV AB S/CO SERPL IA: 0.11 S/CO — SIGNIFICANT CHANGE UP (ref 0–0.99)
HCV AB SERPL-IMP: SIGNIFICANT CHANGE UP
HGB BLD-MCNC: 13.5 G/DL — SIGNIFICANT CHANGE UP (ref 13–17)
HGB BLD-MCNC: 13.6 G/DL — SIGNIFICANT CHANGE UP (ref 13–17)
HGB BLD-MCNC: 14.1 G/DL — SIGNIFICANT CHANGE UP (ref 13–17)
HGB BLD-MCNC: 14.3 G/DL — SIGNIFICANT CHANGE UP (ref 13–17)
HGB BLD-MCNC: 14.4 G/DL — SIGNIFICANT CHANGE UP (ref 13–17)
HGB BLD-MCNC: 14.8 G/DL — SIGNIFICANT CHANGE UP (ref 13–17)
HGB BLD-MCNC: 14.9 G/DL — SIGNIFICANT CHANGE UP (ref 13–17)
HGB BLD-MCNC: 15 G/DL — SIGNIFICANT CHANGE UP (ref 13–17)
HGB BLD-MCNC: 15.2 G/DL — SIGNIFICANT CHANGE UP (ref 13–17)
HGB BLD-MCNC: 15.5 G/DL — SIGNIFICANT CHANGE UP (ref 13–17)
HGB BLD-MCNC: 15.6 G/DL — SIGNIFICANT CHANGE UP (ref 13–17)
HGB BLD-MCNC: 15.7 G/DL — SIGNIFICANT CHANGE UP (ref 13–17)
HGB BLDA-MCNC: 15 G/DL — SIGNIFICANT CHANGE UP (ref 13–17)
HYALINE CASTS # UR AUTO: NEGATIVE — SIGNIFICANT CHANGE UP
IGA FLD-MCNC: 197 MG/DL — SIGNIFICANT CHANGE UP (ref 70–400)
IGG FLD-MCNC: 1037 MG/DL — SIGNIFICANT CHANGE UP (ref 700–1600)
IGM SERPL-MCNC: 22 MG/DL — LOW (ref 40–230)
IMM GRANULOCYTES NFR BLD AUTO: 0.3 % — SIGNIFICANT CHANGE UP (ref 0–1.5)
IMM GRANULOCYTES NFR BLD AUTO: 0.3 % — SIGNIFICANT CHANGE UP (ref 0–1.5)
IMM GRANULOCYTES NFR BLD AUTO: 0.5 % — SIGNIFICANT CHANGE UP (ref 0–1.5)
IMM GRANULOCYTES NFR BLD AUTO: 0.5 % — SIGNIFICANT CHANGE UP (ref 0–1.5)
IMM GRANULOCYTES NFR BLD AUTO: 0.6 % — SIGNIFICANT CHANGE UP (ref 0–1.5)
IMM GRANULOCYTES NFR BLD AUTO: 0.7 % — SIGNIFICANT CHANGE UP (ref 0–1.5)
IMM GRANULOCYTES NFR BLD AUTO: 4.7 % — HIGH (ref 0–1.5)
KETONES UR-MCNC: SIGNIFICANT CHANGE UP
LACTATE BLDA-SCNC: 2.3 MMOL/L — HIGH (ref 0.5–2)
LACTATE SERPL-SCNC: 1 MMOL/L — SIGNIFICANT CHANGE UP (ref 0.7–2)
LACTATE SERPL-SCNC: 1.9 MMOL/L — SIGNIFICANT CHANGE UP (ref 0.5–2)
LDH SERPL L TO P-CCNC: 321 U/L — HIGH (ref 120–225)
LDH SERPL L TO P-CCNC: 428 U/L — HIGH (ref 135–225)
LDH SERPL L TO P-CCNC: 439 U/L — HIGH (ref 135–225)
LDH SERPL L TO P-CCNC: 493 U/L — HIGH (ref 135–225)
LDH SERPL L TO P-CCNC: 649 U/L — HIGH (ref 135–225)
LEUKOCYTE ESTERASE UR-ACNC: NEGATIVE — SIGNIFICANT CHANGE UP
LYMPHOCYTES # BLD AUTO: 0.23 K/UL — LOW (ref 1–3.3)
LYMPHOCYTES # BLD AUTO: 0.27 K/UL — LOW (ref 1–3.3)
LYMPHOCYTES # BLD AUTO: 0.32 K/UL — LOW (ref 1–3.3)
LYMPHOCYTES # BLD AUTO: 0.32 K/UL — LOW (ref 1–3.3)
LYMPHOCYTES # BLD AUTO: 0.36 K/UL — LOW (ref 1–3.3)
LYMPHOCYTES # BLD AUTO: 0.37 K/UL — LOW (ref 1–3.3)
LYMPHOCYTES # BLD AUTO: 0.4 K/UL — LOW (ref 1–3.3)
LYMPHOCYTES # BLD AUTO: 0.62 K/UL — LOW (ref 1–3.3)
LYMPHOCYTES # BLD AUTO: 0.64 K/UL — LOW (ref 1–3.3)
LYMPHOCYTES # BLD AUTO: 0.77 K/UL — LOW (ref 1–3.3)
LYMPHOCYTES # BLD AUTO: 17.8 % — SIGNIFICANT CHANGE UP (ref 13–44)
LYMPHOCYTES # BLD AUTO: 29.4 % — SIGNIFICANT CHANGE UP (ref 13–44)
LYMPHOCYTES # BLD AUTO: 35 % — SIGNIFICANT CHANGE UP (ref 13–44)
LYMPHOCYTES # BLD AUTO: 4.4 % — LOW (ref 13–44)
LYMPHOCYTES # BLD AUTO: 4.9 % — LOW (ref 13–44)
LYMPHOCYTES # BLD AUTO: 5.5 % — LOW (ref 13–44)
LYMPHOCYTES # BLD AUTO: 5.5 % — LOW (ref 13–44)
LYMPHOCYTES # BLD AUTO: 5.8 % — LOW (ref 13–44)
LYMPHOCYTES # BLD AUTO: 8 % — LOW (ref 13–44)
LYMPHOCYTES # BLD AUTO: 8 % — LOW (ref 13–44)
MAGNESIUM SERPL-MCNC: 1.9 MG/DL — SIGNIFICANT CHANGE UP (ref 1.6–2.6)
MAGNESIUM SERPL-MCNC: 1.9 MG/DL — SIGNIFICANT CHANGE UP (ref 1.6–2.6)
MAGNESIUM SERPL-MCNC: 2 MG/DL — SIGNIFICANT CHANGE UP (ref 1.6–2.6)
MAGNESIUM SERPL-MCNC: 2.1 MG/DL — SIGNIFICANT CHANGE UP (ref 1.6–2.6)
MAGNESIUM SERPL-MCNC: 2.2 MG/DL — SIGNIFICANT CHANGE UP (ref 1.6–2.6)
MAGNESIUM SERPL-MCNC: 2.5 MG/DL — SIGNIFICANT CHANGE UP (ref 1.6–2.6)
MANUAL SMEAR VERIFICATION: SIGNIFICANT CHANGE UP
MANUAL SMEAR VERIFICATION: SIGNIFICANT CHANGE UP
MCHC RBC-ENTMCNC: 32 PG — SIGNIFICANT CHANGE UP (ref 27–34)
MCHC RBC-ENTMCNC: 32.1 PG — SIGNIFICANT CHANGE UP (ref 27–34)
MCHC RBC-ENTMCNC: 32.5 PG — SIGNIFICANT CHANGE UP (ref 27–34)
MCHC RBC-ENTMCNC: 32.6 PG — SIGNIFICANT CHANGE UP (ref 27–34)
MCHC RBC-ENTMCNC: 33 PG — SIGNIFICANT CHANGE UP (ref 27–34)
MCHC RBC-ENTMCNC: 33.1 PG — SIGNIFICANT CHANGE UP (ref 27–34)
MCHC RBC-ENTMCNC: 33.2 PG — SIGNIFICANT CHANGE UP (ref 27–34)
MCHC RBC-ENTMCNC: 34.8 % — SIGNIFICANT CHANGE UP (ref 32–36)
MCHC RBC-ENTMCNC: 35.1 % — SIGNIFICANT CHANGE UP (ref 32–36)
MCHC RBC-ENTMCNC: 35.1 % — SIGNIFICANT CHANGE UP (ref 32–36)
MCHC RBC-ENTMCNC: 35.2 % — SIGNIFICANT CHANGE UP (ref 32–36)
MCHC RBC-ENTMCNC: 35.3 % — SIGNIFICANT CHANGE UP (ref 32–36)
MCHC RBC-ENTMCNC: 35.3 % — SIGNIFICANT CHANGE UP (ref 32–36)
MCHC RBC-ENTMCNC: 35.6 % — SIGNIFICANT CHANGE UP (ref 32–36)
MCHC RBC-ENTMCNC: 35.7 % — SIGNIFICANT CHANGE UP (ref 32–36)
MCHC RBC-ENTMCNC: 35.8 % — SIGNIFICANT CHANGE UP (ref 32–36)
MCHC RBC-ENTMCNC: 35.9 % — SIGNIFICANT CHANGE UP (ref 32–36)
MCHC RBC-ENTMCNC: 35.9 % — SIGNIFICANT CHANGE UP (ref 32–36)
MCHC RBC-ENTMCNC: 35.9 GM/DL — SIGNIFICANT CHANGE UP (ref 32–36)
MCV RBC AUTO: 89.3 FL — SIGNIFICANT CHANGE UP (ref 80–100)
MCV RBC AUTO: 90 FL — SIGNIFICANT CHANGE UP (ref 80–100)
MCV RBC AUTO: 90.7 FL — SIGNIFICANT CHANGE UP (ref 80–100)
MCV RBC AUTO: 91.2 FL — SIGNIFICANT CHANGE UP (ref 80–100)
MCV RBC AUTO: 91.3 FL — SIGNIFICANT CHANGE UP (ref 80–100)
MCV RBC AUTO: 91.9 FL — SIGNIFICANT CHANGE UP (ref 80–100)
MCV RBC AUTO: 92 FL — SIGNIFICANT CHANGE UP (ref 80–100)
MCV RBC AUTO: 92.2 FL — SIGNIFICANT CHANGE UP (ref 80–100)
MCV RBC AUTO: 92.3 FL — SIGNIFICANT CHANGE UP (ref 80–100)
MCV RBC AUTO: 92.6 FL — SIGNIFICANT CHANGE UP (ref 80–100)
MCV RBC AUTO: 93.8 FL — SIGNIFICANT CHANGE UP (ref 80–100)
MCV RBC AUTO: 93.9 FL — SIGNIFICANT CHANGE UP (ref 80–100)
MONOCYTES # BLD AUTO: 0.08 K/UL — SIGNIFICANT CHANGE UP (ref 0–0.9)
MONOCYTES # BLD AUTO: 0.1 K/UL — SIGNIFICANT CHANGE UP (ref 0–0.9)
MONOCYTES # BLD AUTO: 0.13 K/UL — SIGNIFICANT CHANGE UP (ref 0–0.9)
MONOCYTES # BLD AUTO: 0.17 K/UL — SIGNIFICANT CHANGE UP (ref 0–0.9)
MONOCYTES # BLD AUTO: 0.18 K/UL — SIGNIFICANT CHANGE UP (ref 0–0.9)
MONOCYTES # BLD AUTO: 0.21 K/UL — SIGNIFICANT CHANGE UP (ref 0–0.9)
MONOCYTES # BLD AUTO: 0.22 K/UL — SIGNIFICANT CHANGE UP (ref 0–0.9)
MONOCYTES # BLD AUTO: 0.23 K/UL — SIGNIFICANT CHANGE UP (ref 0–0.9)
MONOCYTES # BLD AUTO: 0.33 K/UL — SIGNIFICANT CHANGE UP (ref 0–0.9)
MONOCYTES # BLD AUTO: 0.41 K/UL — SIGNIFICANT CHANGE UP (ref 0–0.9)
MONOCYTES NFR BLD AUTO: 1.8 % — LOW (ref 2–14)
MONOCYTES NFR BLD AUTO: 15 % — HIGH (ref 2–14)
MONOCYTES NFR BLD AUTO: 19.4 % — HIGH (ref 2–14)
MONOCYTES NFR BLD AUTO: 2.2 % — SIGNIFICANT CHANGE UP (ref 2–14)
MONOCYTES NFR BLD AUTO: 2.4 % — SIGNIFICANT CHANGE UP (ref 2–14)
MONOCYTES NFR BLD AUTO: 2.8 % — SIGNIFICANT CHANGE UP (ref 2–14)
MONOCYTES NFR BLD AUTO: 3.1 % — SIGNIFICANT CHANGE UP (ref 2–14)
MONOCYTES NFR BLD AUTO: 3.4 % — SIGNIFICANT CHANGE UP (ref 2–14)
MONOCYTES NFR BLD AUTO: 4.3 % — SIGNIFICANT CHANGE UP (ref 2–14)
MONOCYTES NFR BLD AUTO: 5.8 % — SIGNIFICANT CHANGE UP (ref 2–14)
NEUTROPHILS # BLD AUTO: 0.94 K/UL — LOW (ref 1.8–7.4)
NEUTROPHILS # BLD AUTO: 1.1 K/UL — LOW (ref 1.8–7.4)
NEUTROPHILS # BLD AUTO: 2.7 K/UL — SIGNIFICANT CHANGE UP (ref 1.8–7.4)
NEUTROPHILS # BLD AUTO: 2.9 K/UL — SIGNIFICANT CHANGE UP (ref 1.8–7.4)
NEUTROPHILS # BLD AUTO: 3.49 K/UL — SIGNIFICANT CHANGE UP (ref 1.8–7.4)
NEUTROPHILS # BLD AUTO: 4.19 K/UL — SIGNIFICANT CHANGE UP (ref 1.8–7.4)
NEUTROPHILS # BLD AUTO: 5.25 K/UL — SIGNIFICANT CHANGE UP (ref 1.8–7.4)
NEUTROPHILS # BLD AUTO: 5.75 K/UL — SIGNIFICANT CHANGE UP (ref 1.8–7.4)
NEUTROPHILS # BLD AUTO: 6.57 K/UL — SIGNIFICANT CHANGE UP (ref 1.8–7.4)
NEUTROPHILS # BLD AUTO: 7.41 K/UL — HIGH (ref 1.8–7.4)
NEUTROPHILS NFR BLD AUTO: 44.6 % — SIGNIFICANT CHANGE UP (ref 43–77)
NEUTROPHILS NFR BLD AUTO: 50 % — SIGNIFICANT CHANGE UP (ref 43–77)
NEUTROPHILS NFR BLD AUTO: 75.2 % — SIGNIFICANT CHANGE UP (ref 43–77)
NEUTROPHILS NFR BLD AUTO: 86.3 % — HIGH (ref 43–77)
NEUTROPHILS NFR BLD AUTO: 87.2 % — HIGH (ref 43–77)
NEUTROPHILS NFR BLD AUTO: 90.1 % — HIGH (ref 43–77)
NEUTROPHILS NFR BLD AUTO: 90.2 % — HIGH (ref 43–77)
NEUTROPHILS NFR BLD AUTO: 90.9 % — HIGH (ref 43–77)
NEUTROPHILS NFR BLD AUTO: 91 % — HIGH (ref 43–77)
NEUTROPHILS NFR BLD AUTO: 91.8 % — HIGH (ref 43–77)
NITRITE UR-MCNC: NEGATIVE — SIGNIFICANT CHANGE UP
NRBC # BLD: 0 /100 — SIGNIFICANT CHANGE UP (ref 0–0)
NRBC # FLD: 0 K/UL — SIGNIFICANT CHANGE UP (ref 0–0)
NT-PROBNP SERPL-SCNC: 179.2 PG/ML — SIGNIFICANT CHANGE UP
NT-PROBNP SERPL-SCNC: 225 PG/ML — HIGH (ref 0–125)
OSMOLALITY UR: 79 MOSMO/KG — SIGNIFICANT CHANGE UP (ref 50–1200)
PCO2 BLDA: 29 MMHG — LOW (ref 35–48)
PH BLDA: 7.44 PH — SIGNIFICANT CHANGE UP (ref 7.35–7.45)
PH UR: 6.5 — SIGNIFICANT CHANGE UP (ref 5–8)
PHOSPHATE SERPL-MCNC: 2.6 MG/DL — SIGNIFICANT CHANGE UP (ref 2.5–4.5)
PHOSPHATE SERPL-MCNC: 3 MG/DL — SIGNIFICANT CHANGE UP (ref 2.5–4.5)
PHOSPHATE SERPL-MCNC: 3.2 MG/DL — SIGNIFICANT CHANGE UP (ref 2.5–4.5)
PHOSPHATE SERPL-MCNC: 3.3 MG/DL — SIGNIFICANT CHANGE UP (ref 2.5–4.5)
PHOSPHATE SERPL-MCNC: 3.6 MG/DL — SIGNIFICANT CHANGE UP (ref 2.5–4.5)
PHOSPHATE SERPL-MCNC: 3.6 MG/DL — SIGNIFICANT CHANGE UP (ref 2.5–4.5)
PHOSPHATE SERPL-MCNC: 3.9 MG/DL — SIGNIFICANT CHANGE UP (ref 2.5–4.5)
PHOSPHATE SERPL-MCNC: 3.9 MG/DL — SIGNIFICANT CHANGE UP (ref 2.5–4.5)
PLAT MORPH BLD: NORMAL — SIGNIFICANT CHANGE UP
PLATELET # BLD AUTO: 117 K/UL — LOW (ref 150–400)
PLATELET # BLD AUTO: 157 K/UL — SIGNIFICANT CHANGE UP (ref 150–400)
PLATELET # BLD AUTO: 159 K/UL — SIGNIFICANT CHANGE UP (ref 150–400)
PLATELET # BLD AUTO: 162 K/UL — SIGNIFICANT CHANGE UP (ref 150–400)
PLATELET # BLD AUTO: 168 K/UL — SIGNIFICANT CHANGE UP (ref 150–400)
PLATELET # BLD AUTO: 174 K/UL — SIGNIFICANT CHANGE UP (ref 150–400)
PLATELET # BLD AUTO: 178 K/UL — SIGNIFICANT CHANGE UP (ref 150–400)
PLATELET # BLD AUTO: 178 K/UL — SIGNIFICANT CHANGE UP (ref 150–400)
PLATELET # BLD AUTO: 180 K/UL — SIGNIFICANT CHANGE UP (ref 150–400)
PLATELET # BLD AUTO: 181 K/UL — SIGNIFICANT CHANGE UP (ref 150–400)
PLATELET # BLD AUTO: 190 K/UL — SIGNIFICANT CHANGE UP (ref 150–400)
PLATELET # BLD AUTO: 90 K/UL — LOW (ref 150–400)
PLATELET COUNT - ESTIMATE: ABNORMAL
PMV BLD: 10 FL — SIGNIFICANT CHANGE UP (ref 7–13)
PMV BLD: 10.1 FL — SIGNIFICANT CHANGE UP (ref 7–13)
PMV BLD: 10.2 FL — SIGNIFICANT CHANGE UP (ref 7–13)
PMV BLD: 10.6 FL — SIGNIFICANT CHANGE UP (ref 7–13)
PMV BLD: 12.2 FL — SIGNIFICANT CHANGE UP (ref 7–13)
PMV BLD: 9.7 FL — SIGNIFICANT CHANGE UP (ref 7–13)
PMV BLD: 9.9 FL — SIGNIFICANT CHANGE UP (ref 7–13)
PO2 BLDA: 71 MMHG — LOW (ref 83–108)
POTASSIUM BLDA-SCNC: 3.3 MMOL/L — LOW (ref 3.4–4.5)
POTASSIUM SERPL-MCNC: 3.2 MMOL/L — LOW (ref 3.5–5.3)
POTASSIUM SERPL-MCNC: 3.3 MMOL/L — LOW (ref 3.5–5.3)
POTASSIUM SERPL-MCNC: 3.4 MMOL/L — LOW (ref 3.5–5.3)
POTASSIUM SERPL-MCNC: 3.5 MMOL/L — SIGNIFICANT CHANGE UP (ref 3.5–5.3)
POTASSIUM SERPL-MCNC: 3.6 MMOL/L — SIGNIFICANT CHANGE UP (ref 3.5–5.3)
POTASSIUM SERPL-MCNC: 3.6 MMOL/L — SIGNIFICANT CHANGE UP (ref 3.5–5.3)
POTASSIUM SERPL-MCNC: 3.7 MMOL/L — SIGNIFICANT CHANGE UP (ref 3.5–5.3)
POTASSIUM SERPL-MCNC: 3.8 MMOL/L — SIGNIFICANT CHANGE UP (ref 3.5–5.3)
POTASSIUM SERPL-SCNC: 3.2 MMOL/L — LOW (ref 3.5–5.3)
POTASSIUM SERPL-SCNC: 3.3 MMOL/L — LOW (ref 3.5–5.3)
POTASSIUM SERPL-SCNC: 3.4 MMOL/L — LOW (ref 3.5–5.3)
POTASSIUM SERPL-SCNC: 3.5 MMOL/L — SIGNIFICANT CHANGE UP (ref 3.5–5.3)
POTASSIUM SERPL-SCNC: 3.6 MMOL/L — SIGNIFICANT CHANGE UP (ref 3.5–5.3)
POTASSIUM SERPL-SCNC: 3.6 MMOL/L — SIGNIFICANT CHANGE UP (ref 3.5–5.3)
POTASSIUM SERPL-SCNC: 3.7 MMOL/L — SIGNIFICANT CHANGE UP (ref 3.5–5.3)
POTASSIUM SERPL-SCNC: 3.8 MMOL/L — SIGNIFICANT CHANGE UP (ref 3.5–5.3)
POTASSIUM UR-SCNC: < 3 MMOL/L — SIGNIFICANT CHANGE UP
PROCALCITONIN SERPL-MCNC: 0.11 NG/ML — HIGH (ref 0.02–0.1)
PROCALCITONIN SERPL-MCNC: 0.13 NG/ML — HIGH (ref 0.02–0.1)
PROCALCITONIN SERPL-MCNC: 0.4 NG/ML — HIGH (ref 0.02–0.1)
PROLACTIN SERPL-MCNC: 11.8 NG/ML — SIGNIFICANT CHANGE UP (ref 4.1–18.4)
PROLACTIN SERPL-MCNC: 13.1 NG/ML — SIGNIFICANT CHANGE UP (ref 4.1–18.4)
PROLACTIN SERPL-MCNC: 14.9 NG/ML — SIGNIFICANT CHANGE UP (ref 4.1–18.4)
PROT SERPL-MCNC: 6.7 G/DL — SIGNIFICANT CHANGE UP (ref 6–8.3)
PROT SERPL-MCNC: 6.8 G/DL — SIGNIFICANT CHANGE UP (ref 6–8.3)
PROT SERPL-MCNC: 7.7 G/DL — SIGNIFICANT CHANGE UP (ref 6–8.3)
PROT UR-MCNC: 15.8 MG/DL — SIGNIFICANT CHANGE UP
PROT UR-MCNC: 200 — HIGH
RBC # BLD: 4.08 M/UL — LOW (ref 4.2–5.8)
RBC # BLD: 4.25 M/UL — SIGNIFICANT CHANGE UP (ref 4.2–5.8)
RBC # BLD: 4.25 M/UL — SIGNIFICANT CHANGE UP (ref 4.2–5.8)
RBC # BLD: 4.33 M/UL — SIGNIFICANT CHANGE UP (ref 4.2–5.8)
RBC # BLD: 4.43 M/UL — SIGNIFICANT CHANGE UP (ref 4.2–5.8)
RBC # BLD: 4.48 M/UL — SIGNIFICANT CHANGE UP (ref 4.2–5.8)
RBC # BLD: 4.54 M/UL — SIGNIFICANT CHANGE UP (ref 4.2–5.8)
RBC # BLD: 4.65 M/UL — SIGNIFICANT CHANGE UP (ref 4.2–5.8)
RBC # BLD: 4.75 M/UL — SIGNIFICANT CHANGE UP (ref 4.2–5.8)
RBC # BLD: 4.76 M/UL — SIGNIFICANT CHANGE UP (ref 4.2–5.8)
RBC # BLD: 4.87 M/UL — SIGNIFICANT CHANGE UP (ref 4.2–5.8)
RBC # BLD: 4.89 M/UL — SIGNIFICANT CHANGE UP (ref 4.2–5.8)
RBC # FLD: 12.2 % — SIGNIFICANT CHANGE UP (ref 10.3–14.5)
RBC # FLD: 12.2 % — SIGNIFICANT CHANGE UP (ref 10.3–14.5)
RBC # FLD: 12.3 % — SIGNIFICANT CHANGE UP (ref 10.3–14.5)
RBC # FLD: 12.4 % — SIGNIFICANT CHANGE UP (ref 10.3–14.5)
RBC # FLD: 12.5 % — SIGNIFICANT CHANGE UP (ref 10.3–14.5)
RBC # FLD: 12.5 % — SIGNIFICANT CHANGE UP (ref 10.3–14.5)
RBC # FLD: 12.6 % — SIGNIFICANT CHANGE UP (ref 10.3–14.5)
RBC # FLD: 12.7 % — SIGNIFICANT CHANGE UP (ref 10.3–14.5)
RBC BLD AUTO: NORMAL — SIGNIFICANT CHANGE UP
RBC CASTS # UR COMP ASSIST: SIGNIFICANT CHANGE UP (ref 0–?)
REVIEW TO FOLLOW: YES — SIGNIFICANT CHANGE UP
S PNEUM SEROTYPE IGG SER-IMP: 1.7 MCG/ML — SIGNIFICANT CHANGE UP
S PNEUM SEROTYPE IGG SER-IMP: 2.2 MCG/ML — SIGNIFICANT CHANGE UP
S PNEUM SEROTYPE IGG SER-IMP: 3.3 MCG/ML — SIGNIFICANT CHANGE UP
S PNEUM SEROTYPE IGG SER-IMP: 3.4 MCG/ML — SIGNIFICANT CHANGE UP
S PNEUM SEROTYPE IGG SER-IMP: 38 MCG/ML — SIGNIFICANT CHANGE UP
S PNEUM SEROTYPE IGG SER-IMP: 4.8 MCG/ML — SIGNIFICANT CHANGE UP
S PNEUM SEROTYPE IGG SER-IMP: 9.2 MCG/ML — SIGNIFICANT CHANGE UP
SAO2 % BLDA: 94.4 % — LOW (ref 95–99)
SARS-COV-2 RNA SPEC QL NAA+PROBE: DETECTED
SODIUM BLDA-SCNC: 132 MMOL/L — LOW (ref 136–146)
SODIUM SERPL-SCNC: 129 MMOL/L — LOW (ref 135–145)
SODIUM SERPL-SCNC: 130 MMOL/L — LOW (ref 135–145)
SODIUM SERPL-SCNC: 131 MMOL/L — LOW (ref 135–145)
SODIUM SERPL-SCNC: 131 MMOL/L — LOW (ref 135–145)
SODIUM SERPL-SCNC: 132 MMOL/L — LOW (ref 135–145)
SODIUM SERPL-SCNC: 132 MMOL/L — LOW (ref 135–145)
SODIUM SERPL-SCNC: 133 MMOL/L — LOW (ref 135–145)
SODIUM SERPL-SCNC: 134 MMOL/L — LOW (ref 135–145)
SODIUM SERPL-SCNC: 134 MMOL/L — LOW (ref 135–145)
SODIUM SERPL-SCNC: 135 MMOL/L — SIGNIFICANT CHANGE UP (ref 135–145)
SODIUM SERPL-SCNC: 135 MMOL/L — SIGNIFICANT CHANGE UP (ref 135–145)
SODIUM SERPL-SCNC: 136 MMOL/L — SIGNIFICANT CHANGE UP (ref 135–145)
SODIUM UR-SCNC: < 20 MMOL/L — SIGNIFICANT CHANGE UP
SP GR SPEC: 1.03 — SIGNIFICANT CHANGE UP (ref 1–1.04)
SPECIMEN SOURCE: SIGNIFICANT CHANGE UP
SQUAMOUS # UR AUTO: SIGNIFICANT CHANGE UP
TROPONIN I SERPL-MCNC: 0.02 NG/ML — SIGNIFICANT CHANGE UP (ref 0–0.04)
UROBILINOGEN FLD QL: NORMAL — SIGNIFICANT CHANGE UP
WBC # BLD: 2.03 K/UL — LOW (ref 3.8–10.5)
WBC # BLD: 2.11 K/UL — LOW (ref 3.8–10.5)
WBC # BLD: 2.2 K/UL — LOW (ref 3.8–10.5)
WBC # BLD: 2.47 K/UL — LOW (ref 3.8–10.5)
WBC # BLD: 3.36 K/UL — LOW (ref 3.8–10.5)
WBC # BLD: 3.59 K/UL — LOW (ref 3.8–10.5)
WBC # BLD: 4 K/UL — SIGNIFICANT CHANGE UP (ref 3.8–10.5)
WBC # BLD: 4.65 K/UL — SIGNIFICANT CHANGE UP (ref 3.8–10.5)
WBC # BLD: 5.78 K/UL — SIGNIFICANT CHANGE UP (ref 3.8–10.5)
WBC # BLD: 6.38 K/UL — SIGNIFICANT CHANGE UP (ref 3.8–10.5)
WBC # BLD: 7.22 K/UL — SIGNIFICANT CHANGE UP (ref 3.8–10.5)
WBC # BLD: 8.09 K/UL — SIGNIFICANT CHANGE UP (ref 3.8–10.5)
WBC # FLD AUTO: 2.03 K/UL — LOW (ref 3.8–10.5)
WBC # FLD AUTO: 2.11 K/UL — LOW (ref 3.8–10.5)
WBC # FLD AUTO: 2.2 K/UL — LOW (ref 3.8–10.5)
WBC # FLD AUTO: 2.47 K/UL — LOW (ref 3.8–10.5)
WBC # FLD AUTO: 3.36 K/UL — LOW (ref 3.8–10.5)
WBC # FLD AUTO: 3.59 K/UL — LOW (ref 3.8–10.5)
WBC # FLD AUTO: 4 K/UL — SIGNIFICANT CHANGE UP (ref 3.8–10.5)
WBC # FLD AUTO: 4.65 K/UL — SIGNIFICANT CHANGE UP (ref 3.8–10.5)
WBC # FLD AUTO: 5.78 K/UL — SIGNIFICANT CHANGE UP (ref 3.8–10.5)
WBC # FLD AUTO: 6.38 K/UL — SIGNIFICANT CHANGE UP (ref 3.8–10.5)
WBC # FLD AUTO: 7.22 K/UL — SIGNIFICANT CHANGE UP (ref 3.8–10.5)
WBC # FLD AUTO: 8.09 K/UL — SIGNIFICANT CHANGE UP (ref 3.8–10.5)
WBC UR QL: SIGNIFICANT CHANGE UP (ref 0–?)

## 2020-01-01 PROCEDURE — 71045 X-RAY EXAM CHEST 1 VIEW: CPT

## 2020-01-01 PROCEDURE — 83615 LACTATE (LD) (LDH) ENZYME: CPT

## 2020-01-01 PROCEDURE — 93005 ELECTROCARDIOGRAM TRACING: CPT

## 2020-01-01 PROCEDURE — 80053 COMPREHEN METABOLIC PANEL: CPT

## 2020-01-01 PROCEDURE — 99233 SBSQ HOSP IP/OBS HIGH 50: CPT

## 2020-01-01 PROCEDURE — 84484 ASSAY OF TROPONIN QUANT: CPT

## 2020-01-01 PROCEDURE — 87635 SARS-COV-2 COVID-19 AMP PRB: CPT

## 2020-01-01 PROCEDURE — 85379 FIBRIN DEGRADATION QUANT: CPT

## 2020-01-01 PROCEDURE — 99233 SBSQ HOSP IP/OBS HIGH 50: CPT | Mod: GC,25

## 2020-01-01 PROCEDURE — 86140 C-REACTIVE PROTEIN: CPT

## 2020-01-01 PROCEDURE — 93010 ELECTROCARDIOGRAM REPORT: CPT | Mod: 76

## 2020-01-01 PROCEDURE — 93010 ELECTROCARDIOGRAM REPORT: CPT

## 2020-01-01 PROCEDURE — 83880 ASSAY OF NATRIURETIC PEPTIDE: CPT

## 2020-01-01 PROCEDURE — 94640 AIRWAY INHALATION TREATMENT: CPT

## 2020-01-01 PROCEDURE — 70450 CT HEAD/BRAIN W/O DYE: CPT | Mod: 26

## 2020-01-01 PROCEDURE — 99285 EMERGENCY DEPT VISIT HI MDM: CPT

## 2020-01-01 PROCEDURE — 84145 PROCALCITONIN (PCT): CPT

## 2020-01-01 PROCEDURE — 83605 ASSAY OF LACTIC ACID: CPT

## 2020-01-01 PROCEDURE — 99285 EMERGENCY DEPT VISIT HI MDM: CPT | Mod: 25

## 2020-01-01 PROCEDURE — 71045 X-RAY EXAM CHEST 1 VIEW: CPT | Mod: 26

## 2020-01-01 PROCEDURE — 85027 COMPLETE CBC AUTOMATED: CPT

## 2020-01-01 PROCEDURE — 36415 COLL VENOUS BLD VENIPUNCTURE: CPT

## 2020-01-01 RX ORDER — ACETAMINOPHEN 500 MG
650 TABLET ORAL EVERY 4 HOURS
Refills: 0 | Status: DISCONTINUED | OUTPATIENT
Start: 2020-01-01 | End: 2020-01-01

## 2020-01-01 RX ORDER — DIPHENHYDRAMINE HCL 50 MG
50 CAPSULE ORAL ONCE
Refills: 0 | Status: COMPLETED | OUTPATIENT
Start: 2020-01-01 | End: 2020-01-01

## 2020-01-01 RX ORDER — INSULIN LISPRO 100/ML
VIAL (ML) SUBCUTANEOUS
Refills: 0 | Status: DISCONTINUED | OUTPATIENT
Start: 2020-01-01 | End: 2020-01-01

## 2020-01-01 RX ORDER — ACETAMINOPHEN 500 MG
650 TABLET ORAL EVERY 6 HOURS
Refills: 0 | Status: DISCONTINUED | OUTPATIENT
Start: 2020-01-01 | End: 2020-01-01

## 2020-01-01 RX ORDER — CEFEPIME 1 G/1
1000 INJECTION, POWDER, FOR SOLUTION INTRAMUSCULAR; INTRAVENOUS ONCE
Refills: 0 | Status: COMPLETED | OUTPATIENT
Start: 2020-01-01 | End: 2020-01-01

## 2020-01-01 RX ORDER — ALBUTEROL 90 UG/1
4 AEROSOL, METERED ORAL ONCE
Refills: 0 | Status: COMPLETED | OUTPATIENT
Start: 2020-01-01 | End: 2020-01-01

## 2020-01-01 RX ORDER — OLMESARTAN MEDOXOMIL 5 MG/1
1 TABLET, FILM COATED ORAL
Qty: 0 | Refills: 0 | DISCHARGE

## 2020-01-01 RX ORDER — PANTOPRAZOLE SODIUM 20 MG/1
40 TABLET, DELAYED RELEASE ORAL
Refills: 0 | Status: DISCONTINUED | OUTPATIENT
Start: 2020-01-01 | End: 2020-01-01

## 2020-01-01 RX ORDER — RITUXIMAB 10 MG/ML
0 INJECTION, SOLUTION INTRAVENOUS
Qty: 0 | Refills: 0 | DISCHARGE

## 2020-01-01 RX ORDER — SODIUM CHLORIDE 9 MG/ML
500 INJECTION INTRAMUSCULAR; INTRAVENOUS; SUBCUTANEOUS ONCE
Refills: 0 | Status: COMPLETED | OUTPATIENT
Start: 2020-01-01 | End: 2020-01-01

## 2020-01-01 RX ORDER — ALBUTEROL 90 UG/1
2 AEROSOL, METERED ORAL EVERY 4 HOURS
Refills: 0 | Status: DISCONTINUED | OUTPATIENT
Start: 2020-01-01 | End: 2020-01-01

## 2020-01-01 RX ORDER — GLUCAGON INJECTION, SOLUTION 0.5 MG/.1ML
1 INJECTION, SOLUTION SUBCUTANEOUS ONCE
Refills: 0 | Status: DISCONTINUED | OUTPATIENT
Start: 2020-01-01 | End: 2020-01-01

## 2020-01-01 RX ORDER — SODIUM CHLORIDE 9 MG/ML
1000 INJECTION, SOLUTION INTRAVENOUS
Refills: 0 | Status: DISCONTINUED | OUTPATIENT
Start: 2020-01-01 | End: 2020-01-01

## 2020-01-01 RX ORDER — METFORMIN HYDROCHLORIDE 850 MG/1
1 TABLET ORAL
Qty: 0 | Refills: 0 | DISCHARGE

## 2020-01-01 RX ORDER — CEFEPIME 1 G/1
INJECTION, POWDER, FOR SOLUTION INTRAMUSCULAR; INTRAVENOUS
Refills: 0 | Status: DISCONTINUED | OUTPATIENT
Start: 2020-01-01 | End: 2020-01-01

## 2020-01-01 RX ORDER — HYDROXYCHLOROQUINE SULFATE 200 MG
400 TABLET ORAL EVERY 24 HOURS
Refills: 0 | Status: COMPLETED | OUTPATIENT
Start: 2020-01-01 | End: 2020-01-01

## 2020-01-01 RX ORDER — ACETAMINOPHEN 500 MG
650 TABLET ORAL ONCE
Refills: 0 | Status: COMPLETED | OUTPATIENT
Start: 2020-01-01 | End: 2020-01-01

## 2020-01-01 RX ORDER — HYDROXYCHLOROQUINE SULFATE 200 MG
TABLET ORAL
Refills: 0 | Status: DISCONTINUED | OUTPATIENT
Start: 2020-01-01 | End: 2020-01-01

## 2020-01-01 RX ORDER — POTASSIUM CHLORIDE 20 MEQ
40 PACKET (EA) ORAL ONCE
Refills: 0 | Status: COMPLETED | OUTPATIENT
Start: 2020-01-01 | End: 2020-01-01

## 2020-01-01 RX ORDER — DEXTROSE 50 % IN WATER 50 %
12.5 SYRINGE (ML) INTRAVENOUS ONCE
Refills: 0 | Status: DISCONTINUED | OUTPATIENT
Start: 2020-01-01 | End: 2020-01-01

## 2020-01-01 RX ORDER — POTASSIUM CHLORIDE 20 MEQ
40 PACKET (EA) ORAL EVERY 4 HOURS
Refills: 0 | Status: COMPLETED | OUTPATIENT
Start: 2020-01-01 | End: 2020-01-01

## 2020-01-01 RX ORDER — HALOPERIDOL DECANOATE 100 MG/ML
2 INJECTION INTRAMUSCULAR ONCE
Refills: 0 | Status: COMPLETED | OUTPATIENT
Start: 2020-01-01 | End: 2020-01-01

## 2020-01-01 RX ORDER — DEXTROSE 50 % IN WATER 50 %
25 SYRINGE (ML) INTRAVENOUS ONCE
Refills: 0 | Status: DISCONTINUED | OUTPATIENT
Start: 2020-01-01 | End: 2020-01-01

## 2020-01-01 RX ORDER — DEXTROSE 50 % IN WATER 50 %
15 SYRINGE (ML) INTRAVENOUS ONCE
Refills: 0 | Status: DISCONTINUED | OUTPATIENT
Start: 2020-01-01 | End: 2020-01-01

## 2020-01-01 RX ORDER — TOCILIZUMAB 20 MG/ML
400 INJECTION, SOLUTION, CONCENTRATE INTRAVENOUS ONCE
Refills: 0 | Status: COMPLETED | OUTPATIENT
Start: 2020-01-01 | End: 2020-01-01

## 2020-01-01 RX ORDER — HYDROXYCHLOROQUINE SULFATE 200 MG
800 TABLET ORAL EVERY 24 HOURS
Refills: 0 | Status: COMPLETED | OUTPATIENT
Start: 2020-01-01 | End: 2020-01-01

## 2020-01-01 RX ORDER — ENOXAPARIN SODIUM 100 MG/ML
100 INJECTION SUBCUTANEOUS
Refills: 0 | Status: DISCONTINUED | OUTPATIENT
Start: 2020-01-01 | End: 2020-01-01

## 2020-01-01 RX ORDER — MORPHINE SULFATE 50 MG/1
2 CAPSULE, EXTENDED RELEASE ORAL ONCE
Refills: 0 | Status: DISCONTINUED | OUTPATIENT
Start: 2020-01-01 | End: 2020-01-01

## 2020-01-01 RX ORDER — CEFEPIME 1 G/1
1000 INJECTION, POWDER, FOR SOLUTION INTRAMUSCULAR; INTRAVENOUS EVERY 8 HOURS
Refills: 0 | Status: DISCONTINUED | OUTPATIENT
Start: 2020-01-01 | End: 2020-01-01

## 2020-01-01 RX ORDER — HYDROXYCHLOROQUINE SULFATE 200 MG
TABLET ORAL
Refills: 0 | Status: COMPLETED | OUTPATIENT
Start: 2020-01-01 | End: 2020-01-01

## 2020-01-01 RX ORDER — HYDROXYCHLOROQUINE SULFATE 200 MG
800 TABLET ORAL EVERY 24 HOURS
Refills: 0 | Status: DISCONTINUED | OUTPATIENT
Start: 2020-01-01 | End: 2020-01-01

## 2020-01-01 RX ORDER — GUAIFENESIN/DEXTROMETHORPHAN 600MG-30MG
10 TABLET, EXTENDED RELEASE 12 HR ORAL EVERY 4 HOURS
Refills: 0 | Status: DISCONTINUED | OUTPATIENT
Start: 2020-01-01 | End: 2020-01-01

## 2020-01-01 RX ORDER — LOSARTAN POTASSIUM 100 MG/1
25 TABLET, FILM COATED ORAL DAILY
Refills: 0 | Status: DISCONTINUED | OUTPATIENT
Start: 2020-01-01 | End: 2020-01-01

## 2020-01-01 RX ORDER — ENOXAPARIN SODIUM 100 MG/ML
40 INJECTION SUBCUTANEOUS DAILY
Refills: 0 | Status: DISCONTINUED | OUTPATIENT
Start: 2020-01-01 | End: 2020-01-01

## 2020-01-01 RX ORDER — ONDANSETRON 8 MG/1
4 TABLET, FILM COATED ORAL EVERY 6 HOURS
Refills: 0 | Status: DISCONTINUED | OUTPATIENT
Start: 2020-01-01 | End: 2020-01-01

## 2020-01-01 RX ORDER — LOPERAMIDE HCL 2 MG
2 TABLET ORAL ONCE
Refills: 0 | Status: DISCONTINUED | OUTPATIENT
Start: 2020-01-01 | End: 2020-01-01

## 2020-01-01 RX ORDER — LENALIDOMIDE 5 MG/1
1 CAPSULE ORAL
Qty: 0 | Refills: 0 | DISCHARGE

## 2020-01-01 RX ORDER — MORPHINE SULFATE 50 MG/1
1 CAPSULE, EXTENDED RELEASE ORAL EVERY 4 HOURS
Refills: 0 | Status: DISCONTINUED | OUTPATIENT
Start: 2020-01-01 | End: 2020-01-01

## 2020-01-01 RX ORDER — HYDROXYCHLOROQUINE SULFATE 200 MG
400 TABLET ORAL EVERY 24 HOURS
Refills: 0 | Status: DISCONTINUED | OUTPATIENT
Start: 2020-01-01 | End: 2020-01-01

## 2020-01-01 RX ADMIN — Medication 650 MILLIGRAM(S): at 11:49

## 2020-01-01 RX ADMIN — ENOXAPARIN SODIUM 40 MILLIGRAM(S): 100 INJECTION SUBCUTANEOUS at 11:09

## 2020-01-01 RX ADMIN — Medication 650 MILLIGRAM(S): at 06:24

## 2020-01-01 RX ADMIN — CEFEPIME 100 MILLIGRAM(S): 1 INJECTION, POWDER, FOR SOLUTION INTRAMUSCULAR; INTRAVENOUS at 18:41

## 2020-01-01 RX ADMIN — ENOXAPARIN SODIUM 40 MILLIGRAM(S): 100 INJECTION SUBCUTANEOUS at 13:51

## 2020-01-01 RX ADMIN — Medication 40 MILLIEQUIVALENT(S): at 15:02

## 2020-01-01 RX ADMIN — CEFEPIME 100 MILLIGRAM(S): 1 INJECTION, POWDER, FOR SOLUTION INTRAMUSCULAR; INTRAVENOUS at 19:34

## 2020-01-01 RX ADMIN — ENOXAPARIN SODIUM 100 MILLIGRAM(S): 100 INJECTION SUBCUTANEOUS at 12:11

## 2020-01-01 RX ADMIN — CEFEPIME 100 MILLIGRAM(S): 1 INJECTION, POWDER, FOR SOLUTION INTRAMUSCULAR; INTRAVENOUS at 03:37

## 2020-01-01 RX ADMIN — Medication 40 MILLIEQUIVALENT(S): at 17:59

## 2020-01-01 RX ADMIN — Medication 650 MILLIGRAM(S): at 11:00

## 2020-01-01 RX ADMIN — Medication 650 MILLIGRAM(S): at 15:50

## 2020-01-01 RX ADMIN — ENOXAPARIN SODIUM 100 MILLIGRAM(S): 100 INJECTION SUBCUTANEOUS at 09:35

## 2020-01-01 RX ADMIN — ENOXAPARIN SODIUM 100 MILLIGRAM(S): 100 INJECTION SUBCUTANEOUS at 12:51

## 2020-01-01 RX ADMIN — ENOXAPARIN SODIUM 40 MILLIGRAM(S): 100 INJECTION SUBCUTANEOUS at 12:28

## 2020-01-01 RX ADMIN — Medication 650 MILLIGRAM(S): at 15:05

## 2020-01-01 RX ADMIN — Medication 650 MILLIGRAM(S): at 00:33

## 2020-01-01 RX ADMIN — Medication 40 MILLIEQUIVALENT(S): at 18:01

## 2020-01-01 RX ADMIN — ENOXAPARIN SODIUM 40 MILLIGRAM(S): 100 INJECTION SUBCUTANEOUS at 12:24

## 2020-01-01 RX ADMIN — Medication 40 MILLIEQUIVALENT(S): at 10:15

## 2020-01-01 RX ADMIN — ENOXAPARIN SODIUM 40 MILLIGRAM(S): 100 INJECTION SUBCUTANEOUS at 13:12

## 2020-01-01 RX ADMIN — SODIUM CHLORIDE 500 MILLILITER(S): 9 INJECTION INTRAMUSCULAR; INTRAVENOUS; SUBCUTANEOUS at 11:49

## 2020-01-01 RX ADMIN — SODIUM CHLORIDE 250 MILLILITER(S): 9 INJECTION INTRAMUSCULAR; INTRAVENOUS; SUBCUTANEOUS at 15:06

## 2020-01-01 RX ADMIN — Medication 1: at 09:01

## 2020-01-01 RX ADMIN — Medication 40 MILLIEQUIVALENT(S): at 12:27

## 2020-01-01 RX ADMIN — Medication 800 MILLIGRAM(S): at 00:25

## 2020-01-01 RX ADMIN — ENOXAPARIN SODIUM 100 MILLIGRAM(S): 100 INJECTION SUBCUTANEOUS at 00:08

## 2020-01-01 RX ADMIN — ALBUTEROL 4 PUFF(S): 90 AEROSOL, METERED ORAL at 11:49

## 2020-01-01 RX ADMIN — CEFEPIME 100 MILLIGRAM(S): 1 INJECTION, POWDER, FOR SOLUTION INTRAMUSCULAR; INTRAVENOUS at 12:45

## 2020-01-01 RX ADMIN — Medication 400 MILLIGRAM(S): at 01:11

## 2020-01-01 RX ADMIN — Medication 400 MILLIGRAM(S): at 22:07

## 2020-01-01 RX ADMIN — Medication 1 MILLIGRAM(S): at 22:08

## 2020-01-01 RX ADMIN — Medication 100 MILLIGRAM(S): at 01:37

## 2020-01-01 RX ADMIN — CEFEPIME 100 MILLIGRAM(S): 1 INJECTION, POWDER, FOR SOLUTION INTRAMUSCULAR; INTRAVENOUS at 10:15

## 2020-01-01 RX ADMIN — Medication 50 MILLIGRAM(S): at 22:07

## 2020-01-01 RX ADMIN — Medication 400 MILLIGRAM(S): at 22:27

## 2020-01-01 RX ADMIN — ENOXAPARIN SODIUM 40 MILLIGRAM(S): 100 INJECTION SUBCUTANEOUS at 10:15

## 2020-01-01 RX ADMIN — ENOXAPARIN SODIUM 40 MILLIGRAM(S): 100 INJECTION SUBCUTANEOUS at 11:44

## 2020-01-01 RX ADMIN — Medication 400 MILLIGRAM(S): at 00:19

## 2020-01-01 RX ADMIN — ENOXAPARIN SODIUM 100 MILLIGRAM(S): 100 INJECTION SUBCUTANEOUS at 23:36

## 2020-01-01 RX ADMIN — ENOXAPARIN SODIUM 40 MILLIGRAM(S): 100 INJECTION SUBCUTANEOUS at 12:45

## 2020-01-01 RX ADMIN — ENOXAPARIN SODIUM 100 MILLIGRAM(S): 100 INJECTION SUBCUTANEOUS at 23:57

## 2020-01-01 RX ADMIN — TOCILIZUMAB 100 MILLIGRAM(S): 20 INJECTION, SOLUTION, CONCENTRATE INTRAVENOUS at 13:51

## 2020-01-01 RX ADMIN — HALOPERIDOL DECANOATE 2 MILLIGRAM(S): 100 INJECTION INTRAMUSCULAR at 10:07

## 2020-01-01 RX ADMIN — CEFEPIME 100 MILLIGRAM(S): 1 INJECTION, POWDER, FOR SOLUTION INTRAMUSCULAR; INTRAVENOUS at 02:35

## 2020-01-01 RX ADMIN — Medication 1 MILLIGRAM(S): at 23:41

## 2020-01-01 RX ADMIN — ALBUTEROL 2 PUFF(S): 90 AEROSOL, METERED ORAL at 01:37

## 2020-04-14 NOTE — H&P ADULT - NSICDXPASTMEDICALHX_GEN_ALL_CORE_FT
PAST MEDICAL HISTORY:  Benign essential hypertension     Colonic polyp     Hypertension     Hypogonadism male late-onset    Nonspecific mesenteric lymphadenitis     Panniculitis     Pneumonia 2009

## 2020-04-14 NOTE — H&P ADULT - NSHPPHYSICALEXAM_GEN_ALL_CORE
PHYSICAL EXAM:  GENERAL: NAD,  HEAD:  Atraumatic, Normocephalic  EYES:  conjunctiva and sclera clear  NECK: Supple, No JVD, Normal thyroid  CHEST/LUNG: BILATERAL CRACKLES ARE PRESENT   HEART: Regular rate and rhythm; No murmurs, rubs, or gallops  ABDOMEN: Soft, Nontender, Nondistended; Bowel sounds present  NERVOUS SYSTEM:  Alert & Oriented X3,    EXTREMITIES:  2+ Peripheral Pulses, No clubbing, cyanosis, or edema  SKIN: warm dry

## 2020-04-14 NOTE — H&P ADULT - NSHPPHYSICALEXAM_GEN_ALL_CORE
Vital Signs Last 24 Hrs  T(C): 36.4 (14 Apr 2020 15:27), Max: 38.4 (14 Apr 2020 09:54)  T(F): 97.6 (14 Apr 2020 15:27), Max: 101.2 (14 Apr 2020 09:54)  HR: 51 (14 Apr 2020 15:27) (51 - 71)  BP: 110/64 (14 Apr 2020 15:27) (106/67 - 123/81)  BP(mean): --  RR: 18 (14 Apr 2020 15:27) (18 - 22)  SpO2: 93% (14 Apr 2020 15:27) (93% - 100%)  PHYSICAL EXAM:  GENERAL: NAD,  HEAD:  Atraumatic, Normocephalic  EYES:  conjunctiva and sclera clear  NECK: Supple, No JVD, Normal thyroid  CHEST/LUNG: BILATERAL CRACKLES ARE PRESENT   HEART: Regular rate and rhythm; No murmurs, rubs, or gallops  ABDOMEN: Soft, Nontender, Nondistended; Bowel sounds present  NERVOUS SYSTEM:  Alert & Oriented X3,    EXTREMITIES:  2+ Peripheral Pulses, No clubbing, cyanosis, or edema  SKIN: warm dry No bruits; no thyromegaly or nodules

## 2020-04-14 NOTE — ED ADULT NURSE NOTE - ED STAT RN HANDOFF DETAILS
[facility] being transferred to [facility] being transferred to [place] to facility more room for Covid. Being transported via ambulance stable in no acute distress vis stretcher safety maintained.

## 2020-04-14 NOTE — ED PROVIDER NOTE - CLINICAL SUMMARY MEDICAL DECISION MAKING FREE TEXT BOX
65 yo male presents with fever, chills, and cough. His symptoms are suggestive of Covid. Will obtain Covid swab. Will obtain XR, labs, ventolin, and will admit for supplemental O2. 63 yo male presents with fever, chills, and cough. His symptoms are suggestive of COVID. Will obtain COVID swab, CXR, labs, ventolin, and will admit for needing supplemental O2.

## 2020-04-14 NOTE — H&P ADULT - HISTORY OF PRESENT ILLNESS
65 yo male from home, ambulates independently with PMHx of B-Cell Lymphoma currently undergoing treatment at [facility] [place], HTN, DM presents with fever, chills, and cough x1 week. Patient went to Urgent Care to get XR and was found to have O2 saturation at 80% so EMS was called. Patient was hospitalised for PNA in the past. Patient denies sick contacts, recent travel, chest pain, diarrhea, nausea, vomiting, or headache.  GOC Full code  Allergic to Ibuprofen

## 2020-04-14 NOTE — H&P ADULT - NSHPREVIEWOFSYSTEMS_GEN_ALL_CORE
REVIEW OF SYSTEMS:    CONSTITUTIONAL:, fevers or chills  EYES/ENT: No visual changes;  No vertigo or throat pain   NECK: No pain or stiffness  RESPIRATORY: shortness of breath  CARDIOVASCULAR: No chest pain or palpitations  GASTROINTESTINAL: No abdominal or epigastric pain. No nausea, vomiting, or hematemesis; No diarrhea or constipation. No melena or hematochezia.  GENITOURINARY: No dysuria, frequency or hematuria  NEUROLOGICAL: No numbness or weakness  SKIN: No itching, burning, rashes, or lesions   All other review of systems is negative unless indicated above.

## 2020-04-14 NOTE — H&P ADULT - ASSESSMENT
65 yo male from home, ambulates independently with PMHx of B-Cell Lymphoma currently undergoing treatment at [facility] [place], HTN, DM presents with fever, chills, and cough x1 week admitted with sepsis and hypoxic resp failure 2/2 to COVID 19 infection

## 2020-04-14 NOTE — H&P ADULT - NSICDXFAMILYHX_GEN_ALL_CORE_FT
FAMILY HISTORY:  Father  Still living? Unknown  Family history of colitis, Age at diagnosis: Age Unknown    Mother  Still living? Unknown  Family history of diabetes mellitus, Age at diagnosis: Age Unknown    Grandparent  Still living? Unknown  Family history of stroke, Age at diagnosis: Age Unknown

## 2020-04-14 NOTE — H&P ADULT - PROBLEM SELECTOR PLAN 6
-hyponatremia likely 2/2   poor po intake, Mild Hyponatremia  s/p 500 ml of IVF  - on exam: AAOx3 on admission , Asymptomatic   - on admission Na 129  - Monitor serum sodium for now and aim to correct serum sodium not more than 6-8 meq/L in the first 24hrs -hyponatremia likely 2/2 poor po intake, Mild Hyponatremia  s/p 500 ml of IVF at [facility]  - on exam: AAOx3 on admission , Asymptomatic   - on admission Na 129  - Monitor serum sodium for now and aim to correct serum sodium not more than 6-8 meq/L in the first 24hrs

## 2020-04-14 NOTE — H&P ADULT - NSHPLABSRESULTS_GEN_ALL_CORE
LABS:                        13.5   2.20  )-----------( 117      ( 14 Apr 2020 10:17 )             37.6     04-14    129<L>  |  97  |  14  ----------------------------<  108<H>  3.4<L>   |  22  |  0.99    Ca    7.9<L>      14 Apr 2020 10:17    TPro  6.7  /  Alb  3.0<L>  /  TBili  0.5  /  DBili  x   /  AST  40  /  ALT  30  /  AlkPhos  32<L>  04-14        LIVER FUNCTIONS - ( 14 Apr 2020 10:17 )  Alb: 3.0 g/dL / Pro: 6.7 g/dL / ALK PHOS: 32 U/L / ALT: 30 U/L DA / AST: 40 U/L / GGT: x           Lactate, Blood: 1.0 mmol/L (04-14-20 @ 10:17)

## 2020-04-14 NOTE — ED ADULT TRIAGE NOTE - CHIEF COMPLAINT QUOTE
fever, cough x 2 wks ago. DX with Pneumonia.  Now with increasing SOB and hypoxia. As per EMS O2 sat 82% RA today.

## 2020-04-14 NOTE — H&P ADULT - PROBLEM SELECTOR PLAN 3
-follows   -will hold outpt chemotherapy given acute infection -follows with [facility]  -will hold outpt chemotherapy given acute infection

## 2020-04-14 NOTE — H&P ADULT - NSHPREVIEWOFSYSTEMS_GEN_ALL_CORE
As per ED:     CONSTITUTIONAL:, fevers or chills  EYES/ENT: No visual changes;  No vertigo or throat pain   NECK: No pain or stiffness  RESPIRATORY: shortness of breath  CARDIOVASCULAR: No chest pain or palpitations  GASTROINTESTINAL: No abdominal or epigastric pain. No nausea, vomiting, or hematemesis; No diarrhea or constipation. No melena or hematochezia.  GENITOURINARY: No dysuria, frequency or hematuria  NEUROLOGICAL: No numbness or weakness  SKIN: No itching, burning, rashes, or lesions   All other review of systems is negative unless indicated above.

## 2020-04-14 NOTE — H&P ADULT - NSHPLABSRESULTS_GEN_ALL_CORE
13.5   	2.20  )-----------( 117      ( 14 Apr 2020 10:17 )  	           37.6     	04-14    	129<L>  |  97  |  14  	----------------------------<  108<H>  	3.4<L>   |  22  |  0.99    	Ca    7.9<L>      14 Apr 2020 10:17    	TPro  6.7  /  Alb  3.0<L>  /  TBili  0.5  /  DBili  x   /  AST  40  /  ALT  30  /  AlkPhos  32<L>  04-14        	LIVER FUNCTIONS - ( 14 Apr 2020 10:17 )  	Alb: 3.0 g/dL / Pro: 6.7 g/dL / ALK PHOS: 32 U/L / ALT: 30 U/L DA / AST: 40 U/L / GGT: x           	Lactate, Blood: 1.0 mmol/L (04-14-20 @ 10:17)  Labs reviewed  CXR demonstrated left mid lung PNA  EKG: NSR, left axis deviation. No acute ST elevation. QTc 419

## 2020-04-14 NOTE — H&P ADULT - PROBLEM SELECTOR PLAN 5
-leukopenic and neutropenic (ANC of 1,1000 ), thrombocytopenia all likely 2/2 to bone marrow suppression in the setting of active chemotherapy  -continue to trend

## 2020-04-14 NOTE — H&P ADULT - ASSESSMENT
63 yo male from home, ambulates independently with PMHx of B-Cell Lymphoma currently undergoing treatment at [facility], HTN, DM presents with fever, chills, and cough x1 week. Admitted to medicine for Pneumonia and suspicion for COVID.

## 2020-04-14 NOTE — H&P ADULT - PROBLEM SELECTOR PLAN 1
Pt is hypoxic on admission with RR 22 and saturating 97 on NC  Currently pt is saturating 97% on NC  c/w Oxygen supplementation and maintain >90%   CXR : Left mid lung PNA  s/p 500mL in ED    f/u Flu, COVID, RVP  f/u D dimer, Ferritin, LDH, Procalcitonin, ESR, CRP  f/u Strep, Mycoplasma, Legionella  Isolation precautions  Started on Hydroxychloroquine

## 2020-04-14 NOTE — H&P ADULT - HISTORY OF PRESENT ILLNESS
65 yo male from home, ambulates independently with PMHx of B-Cell Lymphoma currently undergoing treatment at [facility] [place], HTN, DM presents with fever, chills, and cough x1 week. Patient went to Urgent Care to get XR and was found to have O2 saturation at 80% so EMS was called. Patient was hospitalised for PNA in the past. Patient denies sick contacts, recent travel, chest pain, diarrhea, nausea, vomiting, or headache.  GOC Full code  Allergic to Ibuprofen Per current hospital medicine emergency protocol, in an effort to reduce COVID exposures and also conserve PPE for necessary encounters, overnight H&P below is obtained from chart review without direct patient contact unless acute clinical changes.  If able, the history is confirmed with the patient over the phone  65 yo male from home, ambulates independently with PMHx of B-Cell Lymphoma currently undergoing treatment at [facility] [place], HTN, DM presents with fever, chills, and cough x1 week. Patient went to Urgent Care to get XR and was found to have O2 saturation at 80% so EMS was called. Patient was hospitalised for PNA in the past. Patient denies sick contacts, recent travel, chest pain, diarrhea, nausea, vomiting, or headache.  GOC Full code  Allergic to Ibuprofen

## 2020-04-14 NOTE — ED PROVIDER NOTE - OBJECTIVE STATEMENT
65 yo male with PMHx of B-Cell Lymphoma currently undergoing treatment at [facility] [place], presents with fever, chills, and cough x1 week. Patient went to Urgent Care to get XR and was found to have O2 saturation at 80% so EMS was called. Patient reports distant history of PNA. Patient denies sick contacts, recent travel, chest pain, diarrhea, vomiting, or headache.

## 2020-04-14 NOTE — H&P ADULT - PROBLEM SELECTOR PLAN 3
- hyponatremia likely 2/2   poor po intake, Mild Hyponatremia  s/p 500 ml of IVF  - on exam: AAOx3 on admission , Asymptomatic   - on admission Na 129  - Monitor serum sodium for now and aim to correct serum sodium not more than 6-8 meq/L in the first 24hrs  f/u Urine electrolytes  F/U Urine osm, Serum osm

## 2020-04-14 NOTE — H&P ADULT - PROBLEM SELECTOR PLAN 1
-Pt febrile, tachypneic with WBC <4 on presentation to [facility]  -CXR with ground glass opacities in left lobe. Pt initially on a NRB but de-escalated to NC  -COVID PCR +  -suspect sepsis likely related to viral PNA. Lower suspicion for bacterial superinfection given improvement off antibiotic  -low risk neutropenia, will monitor off antibiotics for now  -trend absolute neutrophil count. If drops below 500, will start empirically on cefepime   -start on plaquenil -Pt febrile, tachypneic with WBC <4 on presentation to [facility]  -CXR with ground glass opacities in left lobe. Pt initially on a NRB but de-escalated to NC  -COVID PCR + at [facility] (Pt has a two MRNs)  -suspect sepsis likely related to viral PNA. Lower suspicion for bacterial superinfection given improvement off antibiotic  -low risk neutropenia, will monitor off antibiotics for now  -trend absolute neutrophil count. If drops below 500, will start empirically on cefepime   -start on plaquenil -Pt febrile, tachypneic with WBC <4 on presentation to [facility]  -CXR with ground glass opacities in left lobe. Pt initially on a NRB but de-escalated to NC  -COVID PCR + at [facility] (Pt has a two MRNs)  -suspect sepsis likely related to viral PNA. Lower suspicion for bacterial superinfection given improvement off antibiotic  -low risk neutropenia, will monitor off antibiotics for now  -with reported 13 day history of diarrhea which may be 2.2 to covid however will check c diff, GI PCR and stool studies  -trend absolute neutrophil count. If drops below 500, will start empirically on cefepime   -start on plaquenil

## 2020-04-14 NOTE — H&P ADULT - PROBLEM SELECTOR PLAN 8
IMPROVE VTE Individual Risk Assessment    RISK                                                                Points  [  ] Previous VTE                                                  3  [  ] Thrombophilia                                               2  [  ] Lower limb paralysis                                      2        (unable to hold up >15 seconds)    [  ] Current Cancer                                              2         (within 6 months)  [x ] Immobilization > 24 hrs                                1  [  ] ICU/CCU stay > 24 hours                              1  [x  ] Age > 60                                                      1  IMPROVE VTE Score __2_DVT ppx Lovenox 40 sub q______  )

## 2020-04-14 NOTE — H&P ADULT - PROBLEM SELECTOR PLAN 2
Pt is hypoxic on admission with RR 22 and saturating 97 on NC  Currently pt is saturating 97% on NC  c/w Oxygen supplementation and maintain >90%   CXR : Left mid lung PNA  s/p 500mL in ED    f/u Flu, COVID, RVP  f/u D dimer, Ferritin, LDH, Procalcitonin, ESR, CRP  f/u Strep, Legionella  Isolation precautions  Started on Hydroxychloroquine. Pt is hypoxic on admission with RR 22 and saturating 97 on NC  Currently pt is saturating 97% on NC  c/w Oxygen supplementation and maintain >90%   CXR : Left mid lung PNA  s/p 500mL in ED    f/u D dimer, Ferritin, LDH, Procalcitonin, ESR, CRP  f/u Strep, Legionella  Isolation precautions  Started on Hydroxychloroquine.

## 2020-04-14 NOTE — ED ADULT NURSE NOTE - OBJECTIVE STATEMENT
Patient presented to ED BIBA from Urgent Care c/o dry cough fever of 104 SOB at times diarrhea for 2 weeks . Patient denies any N/V or headache. At present time denies any pain

## 2020-04-15 NOTE — PROGRESS NOTE ADULT - PROBLEM SELECTOR PLAN 1
-Pt febrile, tachypneic with WBC <4 on presentation to [facility]  -CXR with ground glass opacities in left lobe. Pt initially on a NRB but de-escalated to NC  -COVID PCR + at [facility] (Pt has a two MRNs)  -suspect sepsis likely related to viral PNA. Lower suspicion for bacterial superinfection given improvement off antibiotic  -low risk neutropenia, will monitor off antibiotics for now  -with reported 13 day history of diarrhea which may be 2.2 to covid however will check c diff, GI PCR and stool studies  -trend absolute neutrophil count. If drops below 500, will start empirically on cefepime   -start on plaquenil -Pt febrile, tachypneic with WBC <4 on presentation to [facility]  -CXR with ground glass opacities in left lobe. Pt initially on a NRB but de-escalated to NC  -COVID PCR + at [facility] (Pt has a two MRNs)  -suspect sepsis likely related to viral PNA. Lower suspicion for bacterial superinfection given improvement off antibiotic  -low risk neutropenia, will monitor off antibiotics for now  -with reported 13 day history of diarrhea which may be 2.2 to covid however will check c diff, GI PCR and stool studies  -trend absolute neutrophil count. If drops below 500, will start empirically on cefepime   -started  on plaquenil

## 2020-04-15 NOTE — PROGRESS NOTE ADULT - PROBLEM SELECTOR PLAN 2
Pt is hypoxic on admission with RR 22 and saturating 97 on NC  Currently pt is saturating 97% on NC  c/w Oxygen supplementation and maintain >90%   CXR : Left mid lung PNA  s/p 500mL in ED    f/u D dimer, Ferritin, LDH, Procalcitonin, ESR, CRP  f/u Strep, Legionella  Isolation precautions  Started on Hydroxychloroquine.

## 2020-04-15 NOTE — PROGRESS NOTE ADULT - PROBLEM SELECTOR PLAN 6
-hyponatremia likely 2/2 poor po intake, Mild Hyponatremia  s/p 500 ml of IVF at [facility]  - on exam: AAOx3 on admission , Asymptomatic   - on admission Na 129  - Monitor serum sodium for now and aim to correct serum sodium not more than 6-8 meq/L in the first 24hrs -hyponatremia likely 2/2 poor po intake, Mild Hyponatremia  s/p 500 ml of IVF at [facility]  - on exam: AAOx3 on admission , Asymptomatic   - on admission Na 129, now 134   - Monitor serum sodium for now

## 2020-04-15 NOTE — PROGRESS NOTE ADULT - SUBJECTIVE AND OBJECTIVE BOX
Patient is a 64y old  Male who presents with a chief complaint of fever (15 Apr 2020 06:29)      SUBJECTIVE / OVERNIGHT EVENTS:    MEDICATIONS  (STANDING):  dextrose 5%. 1000 milliLiter(s) (50 mL/Hr) IV Continuous <Continuous>  dextrose 50% Injectable 12.5 Gram(s) IV Push once  dextrose 50% Injectable 25 Gram(s) IV Push once  dextrose 50% Injectable 25 Gram(s) IV Push once  enoxaparin Injectable 40 milliGRAM(s) SubCutaneous daily  hydroxychloroquine 400 milliGRAM(s) Oral every 24 hours  hydroxychloroquine   Oral   insulin lispro (HumaLOG) corrective regimen sliding scale   SubCutaneous three times a day before meals    MEDICATIONS  (PRN):  acetaminophen   Tablet .. 650 milliGRAM(s) Oral every 4 hours PRN Temp greater or equal to 38.5C (101.3F)  ALBUTerol    90 MICROgram(s) HFA Inhaler 2 Puff(s) Inhalation every 4 hours PRN Shortness of Breath and/or Wheezing  dextrose 40% Gel 15 Gram(s) Oral once PRN Blood Glucose LESS THAN 70 milliGRAM(s)/deciliter  glucagon  Injectable 1 milliGRAM(s) IntraMuscular once PRN Glucose LESS THAN 70 milligrams/deciliter      Vital Signs Last 24 Hrs  T(C): 37.4 (15 Apr 2020 07:27), Max: 38.2 (14 Apr 2020 23:55)  T(F): 99.4 (15 Apr 2020 07:27), Max: 100.8 (14 Apr 2020 23:55)  HR: 70 (15 Apr 2020 07:27) (70 - 70)  BP: 123/59 (15 Apr 2020 07:27) (105/57 - 123/59)  BP(mean): --  RR: 18 (15 Apr 2020 07:27) (18 - 18)  SpO2: 95% (15 Apr 2020 07:27) (93% - 95%)  CAPILLARY BLOOD GLUCOSE      POCT Blood Glucose.: 83 mg/dL (15 Apr 2020 08:44)  POCT Blood Glucose.: 91 mg/dL (14 Apr 2020 21:33)    I&O's Summary      PHYSICAL EXAM:  GENERAL: NAD, well-developed  HEAD:  Atraumatic, Normocephalic  EYES: EOMI, PERRLA, conjunctiva and sclera clear  NECK: Supple, No JVD  CHEST/LUNG: Clear to auscultation bilaterally; No wheeze  HEART: Regular rate and rhythm; No murmurs, rubs, or gallops  ABDOMEN: Soft, Nontender, Nondistended; Bowel sounds present  EXTREMITIES:  2+ Peripheral Pulses, No clubbing, cyanosis, or edema  PSYCH: AAOx3  NEUROLOGY: non-focal  SKIN: No rashes or lesions    LABS:                        14.8   2.11  )-----------( 90       ( 15 Apr 2020 06:22 )             42.0     04-15    134<L>  |  97<L>  |  15  ----------------------------<  87  3.8   |  19<L>  |  0.85    Ca    8.7      15 Apr 2020 06:22  Phos  3.9     04-15  Mg     2.0     04-15    TPro  6.7  /  Alb  3.3  /  TBili  0.5  /  DBili  x   /  AST  39  /  ALT  23  /  AlkPhos  31<L>  04-15              RADIOLOGY & ADDITIONAL TESTS:    Imaging Personally Reviewed:    Consultant(s) Notes Reviewed:      Care Discussed with Consultants/Other Providers: ANGEL LUIS Triana Patient is a 64y old  Male who presents with a chief complaint of fever (15 Apr 2020 06:29)      SUBJECTIVE / OVERNIGHT EVENTS: patient seen and examined by bedside, pt asking medications for diarrhea, , did not have a BM today though . Pt was unaware of being COVID positive, , saturating 93-95% on RA , pt also with low grade fever       MEDICATIONS  (STANDING):  dextrose 5%. 1000 milliLiter(s) (50 mL/Hr) IV Continuous <Continuous>  dextrose 50% Injectable 12.5 Gram(s) IV Push once  dextrose 50% Injectable 25 Gram(s) IV Push once  dextrose 50% Injectable 25 Gram(s) IV Push once  enoxaparin Injectable 40 milliGRAM(s) SubCutaneous daily  hydroxychloroquine 400 milliGRAM(s) Oral every 24 hours  hydroxychloroquine   Oral   insulin lispro (HumaLOG) corrective regimen sliding scale   SubCutaneous three times a day before meals    MEDICATIONS  (PRN):  acetaminophen   Tablet .. 650 milliGRAM(s) Oral every 4 hours PRN Temp greater or equal to 38.5C (101.3F)  ALBUTerol    90 MICROgram(s) HFA Inhaler 2 Puff(s) Inhalation every 4 hours PRN Shortness of Breath and/or Wheezing  dextrose 40% Gel 15 Gram(s) Oral once PRN Blood Glucose LESS THAN 70 milliGRAM(s)/deciliter  glucagon  Injectable 1 milliGRAM(s) IntraMuscular once PRN Glucose LESS THAN 70 milligrams/deciliter      Vital Signs Last 24 Hrs  T(C): 37.4 (15 Apr 2020 07:27), Max: 38.2 (14 Apr 2020 23:55)  T(F): 99.4 (15 Apr 2020 07:27), Max: 100.8 (14 Apr 2020 23:55)  HR: 70 (15 Apr 2020 07:27) (70 - 70)  BP: 123/59 (15 Apr 2020 07:27) (105/57 - 123/59)  BP(mean): --  RR: 18 (15 Apr 2020 07:27) (18 - 18)  SpO2: 95% (15 Apr 2020 07:27) (93% - 95%)  CAPILLARY BLOOD GLUCOSE      POCT Blood Glucose.: 83 mg/dL (15 Apr 2020 08:44)  POCT Blood Glucose.: 91 mg/dL (14 Apr 2020 21:33)    I&O's Summary      PHYSICAL EXAM:  GENERAL: NAD, well-developed  HEAD:  Atraumatic, Normocephalic  EYES: EOMI, PERRLA, conjunctiva and sclera clear  CHEST/LUNG: Clear to auscultation bilaterally; No wheeze  HEART: Regular rate and rhythm;   ABDOMEN: Soft, Nontender, Nondistended; Bowel sounds present  EXTREMITIES:  No clubbing, cyanosis, or edema  PSYCH: AAOx3  NEUROLOGY: non-focal      LABS:                        14.8   2.11  )-----------( 90       ( 15 Apr 2020 06:22 )             42.0     04-15    134<L>  |  97<L>  |  15  ----------------------------<  87  3.8   |  19<L>  |  0.85    Ca    8.7      15 Apr 2020 06:22  Phos  3.9     04-15  Mg     2.0     04-15    TPro  6.7  /  Alb  3.3  /  TBili  0.5  /  DBili  x   /  AST  39  /  ALT  23  /  AlkPhos  31<L>  04-15              RADIOLOGY & ADDITIONAL TESTS:    Imaging Personally Reviewed:    Consultant(s) Notes Reviewed:      Care Discussed with Consultants/Other Providers: ANGEL LUIS Triana

## 2020-04-15 NOTE — PROGRESS NOTE ADULT - SUBJECTIVE AND OBJECTIVE BOX
COVID-19 [ ] DETECTED    HPI:63 yo male from home, ambulates independently with PMHx of B-Cell Lymphoma [place] [facility], HTN, DM presents with fever, chills, and cough x1 week. Patient went to Urgent Care to get XR and was found to have O2 saturation at 80% so EMS was called.   Patient was hospitalised for PNA in the past. Patient denies sick contacts, recent travel, chest pain, diarrhea, nausea, vomiting, or headache.Transferred from [facility] overnight  GOC Full code      Allergic to Ibuprofen (14 Apr 2020 22:35)      INTERVAL HPI: Awake [x ]Dyspnea [x ]Cough [ ] Chest pain [ ]Diarrhea [x ] SpO2 93  % on [ x ]RA [ ] LPM NC [ ] NRBM    MEDICATIONS  (STANDING):  enoxaparin Injectable 40 milliGRAM(s) SubCutaneous daily  hydroxychloroquine 400 milliGRAM(s) Oral every 24 hours  hydroxychloroquine   Oral   insulin lispro (HumaLOG) corrective regimen sliding scale   SubCutaneous three times a day before meals    MEDICATIONS  (PRN):  acetaminophen   Tablet .. 650 milliGRAM(s) Oral every 4 hours PRN Temp greater or equal to 38.5C (101.3F)  ALBUTerol    90 MICROgram(s) HFA Inhaler 2 Puff(s) Inhalation every 4 hours PRN Shortness of Breath and/or Wheezing  dextrose 40% Gel 15 Gram(s) Oral once PRN Blood Glucose LESS THAN 70 milliGRAM(s)/deciliter  glucagon  Injectable 1 milliGRAM(s) IntraMuscular once PRN Glucose LESS THAN 70 milligrams/deciliter      Vital Signs Last 24 Hrs  T(C): 37.7 (15 Apr 2020 01:30), Max: 38.2 (14 Apr 2020 23:55)  T(F): 99.8 (15 Apr 2020 01:30), Max: 100.8 (14 Apr 2020 23:55)  HR: 70 (14 Apr 2020 23:55) (70 - 70)  BP: 105/57 (14 Apr 2020 23:55) (105/57 - 105/57)  RR: 18 (14 Apr 2020 23:55) (18 - 18)  SpO2: 93% (14 Apr 2020 23:55) (93% - 93%)    _________________  PHYSICAL EXAM:  ---------------------------  NAD; Normocephalic; [ ] JVD  LUNGS - [ ] Wheezing; [ ] Equal [ ] Decreased bilateral air entry  HEART: Regular rhythm [x ] murmur  2/6 [x ] systolic [ ] diastolic [ ]rubs  ABDOMEN: Soft, Nontender, non distended  EXTREMITIES: no cyanosis; no edema  NERVOUS SYSTEM:  Awake and alert; no focal neuro  deficits  SKIN:[ ]rash [ ] cyanosis    _________________________________________________  LABS:            POCT Blood Glucose.: 91 mg/dL (14 Apr 2020 21:33)          Impression /Plan:   1. Pneumonia due to COVID-19 with hypoxia [x ]supplemental O2  QTc   ms  2. Hypoalbuminemia  3. Abnormal LFTs due to acute viral illness  4: CoMorbidities [x ]HTN [x ]DM [ ] CAD reviewed medications.    - Oxygen supplementation as needed; keep saturation >92%  - continue Hydroxychloroquine, Vit C ,Zinc, Thiamine  - nutritional supplements encouraged  -  monitor LFTs  - Encourage prone positioning 10-15 mins /hr as tolerated  - consider steroids as clinically expedient   - Isolation precautions for COVID-19 per infection control protocol  - Low threshold for ICU consult  - Prognosis guarded        Plan of care was discussed with patient ; all questions and concerns were addressed and care was aligned with patient's wishes. COVID-19 [x ] DETECTED    HPI:65 yo male from home, ambulates independently with PMHx of B-Cell Lymphoma currently undergoing treatment at [facility], HTN, DM presents with fever, chills, and cough x1 week. Patient went to Urgent Care to get XR and was found to have O2 saturation at 80% so EMS was called.   Patient was hospitalised for PNA in the past. Patient denies sick contacts, recent travel, chest pain, diarrhea, nausea, vomiting, or headache.Transferred from [facility] overnight  GOC Full code      Allergic to Ibuprofen (14 Apr 2020 22:35)      INTERVAL HPI: Awake [x ]Dyspnea [x ]Cough [ ] Chest pain [ ]Diarrhea [x ] SpO2 93  % on [ x ]RA [ ] LPM NC [ ] NRBM    MEDICATIONS  (STANDING):  enoxaparin Injectable 40 milliGRAM(s) SubCutaneous daily  hydroxychloroquine 400 milliGRAM(s) Oral every 24 hours  hydroxychloroquine   Oral   insulin lispro (HumaLOG) corrective regimen sliding scale   SubCutaneous three times a day before meals    MEDICATIONS  (PRN):  acetaminophen   Tablet .. 650 milliGRAM(s) Oral every 4 hours PRN Temp greater or equal to 38.5C (101.3F)  ALBUTerol    90 MICROgram(s) HFA Inhaler 2 Puff(s) Inhalation every 4 hours PRN Shortness of Breath and/or Wheezing  dextrose 40% Gel 15 Gram(s) Oral once PRN Blood Glucose LESS THAN 70 milliGRAM(s)/deciliter  glucagon  Injectable 1 milliGRAM(s) IntraMuscular once PRN Glucose LESS THAN 70 milligrams/deciliter      Vital Signs Last 24 Hrs  T(C): 37.7 (15 Apr 2020 01:30), Max: 38.2 (14 Apr 2020 23:55)  T(F): 99.8 (15 Apr 2020 01:30), Max: 100.8 (14 Apr 2020 23:55)  HR: 70 (14 Apr 2020 23:55) (70 - 70)  BP: 105/57 (14 Apr 2020 23:55) (105/57 - 105/57)  RR: 18 (14 Apr 2020 23:55) (18 - 18)  SpO2: 93% (14 Apr 2020 23:55) (93% - 93%)    _________________  PHYSICAL EXAM:  ---------------------------  NAD; Normocephalic; [ ] JVD  LUNGS - [ ] Wheezing; [ ] Equal [ ] Decreased bilateral air entry  HEART: Regular rhythm [x ] murmur  2/6 [x ] systolic [ ] diastolic [ ]rubs  ABDOMEN: Soft, Nontender, non distended  EXTREMITIES: no cyanosis; no edema  NERVOUS SYSTEM:  Awake and alert; no focal neuro  deficits  SKIN:[ ]rash [ ] cyanosis    _________________________________________________  LABS:    Labs, Radiology, Cardiology, and Other Results: LABS:  	           	           13.5   	2.20  )-----------( 117      ( 14 Apr 2020 10:17 )  	           37.6     	04-14    	129<L>  |  97  |  14  	----------------------------<  108<H>  	3.4<L>   |  22  |  0.99    	Ca    7.9<L>      14 Apr 2020 10:17    	TPro  6.7  /  Alb  3.0<L>  /  TBili  0.5  /  DBili  x   /  AST  40  /  ALT  30  /  AlkPhos  32<L>  04-14        	LIVER FUNCTIONS - ( 14 Apr 2020 10:17 )  	Alb: 3.0 g/dL / Pro: 6.7 g/dL / ALK PHOS: 32 U/L / ALT: 30 U/L DA / AST: 40 U/L / GGT: x           	Lactate, Blood: 1.0 mmol/L (04-14-20 @ 10:17)        COVID-19 PCR . (04.14.20 @ 10:17)    COVID-19 PCR: Detected    EXAM:  XR CHEST PORTABLE IMMED 1V        INTERPRETATION:  Short of breath.  Heart magnified by projection. There is a groundglass infiltrate peripheral left midlung consistent with pneumonia.   No pleural effusion or pneumothorax.  Impression: Left midlung pneumonia.    ECG QTc 419ms      POCT Blood Glucose.: 91 mg/dL (14 Apr 2020 21:33)          Impression /Plan:   1. Pneumonia due to COVID-19 with hypoxia [x ]supplemental O2  QTc 419  ms  2. Hypoalbuminemia  3. Abnormal LFTs due to acute viral illness  4: CoMorbidities [x ]HTN [x ]DM [ ] CAD reviewed medications.    - Oxygen supplementation as needed; keep saturation >92%  - continue Hydroxychloroquine,  - nutritional supplements encouraged  -  monitor LFTs  - Encourage prone positioning 10-15 mins /hr as tolerated  - consider steroids as clinically expedient   - Isolation precautions for COVID-19 per infection control protocol  - Low threshold for ICU consult  - Prognosis guarded        Plan of care was discussed with patient ; all questions and concerns were addressed and care was aligned with patient's wishes.

## 2020-04-15 NOTE — PROGRESS NOTE ADULT - PROBLEM SELECTOR PLAN 8
DVT ppx: lovenox    Will need to confirm home meds in AM DVT ppx: lovenox    Will need to confirm home meds ( D/w ACP)

## 2020-04-16 NOTE — PROGRESS NOTE ADULT - PROBLEM SELECTOR PLAN 1
-Pt febrile, tachypneic with WBC <4 on presentation to [facility]  -CXR with ground glass opacities in left lobe. Pt initially on a NRB but de-escalated to NC  -COVID PCR + at [facility] (Pt has a two MRNs)  -suspect sepsis likely related to viral PNA. Lower suspicion for bacterial superinfection given improvement off antibiotic  -low risk neutropenia, will monitor off antibiotics for now  -with reported 13 day history of diarrhea which may be 2.2 to covid however will check c diff, GI PCR and stool studies  -trend absolute neutrophil count. If drops below 500, will start empirically on cefepime   -started  on plaquenil, today is day 2

## 2020-04-16 NOTE — PROGRESS NOTE ADULT - PROBLEM SELECTOR PLAN 2
Pt is hypoxic on admission with RR 22 and saturating 97 on NC  Currently pt is saturating well on NC 3L  c/w Oxygen supplementation and maintain >90%   CXR : Left mid lung PNA  s/p 500mL in ED    f/u D dimer, Ferritin, LDH, Procalcitonin, ESR, CRP  f/u Strep, Legionella  Isolation precautions  Started on Hydroxychloroquine as above for +COVID with hypoxia

## 2020-04-16 NOTE — CHART NOTE - NSCHARTNOTEFT_GEN_A_CORE
BoiTel patch was initially placed for QTc monitoring on 4/15/2020. New patch placed today as old patch came off.

## 2020-04-16 NOTE — PROGRESS NOTE ADULT - SUBJECTIVE AND OBJECTIVE BOX
Patient is a 64y old  Male who presents with a chief complaint of fever (15 Apr 2020 06:29)    SUBJECTIVE / OVERNIGHT EVENTS: Still requiring O2, currently 3L NC, no respiratory complaints, feels thirsty    MEDICATIONS  (STANDING):  dextrose 5%. 1000 milliLiter(s) (50 mL/Hr) IV Continuous <Continuous>  dextrose 50% Injectable 12.5 Gram(s) IV Push once  dextrose 50% Injectable 25 Gram(s) IV Push once  dextrose 50% Injectable 25 Gram(s) IV Push once  enoxaparin Injectable 40 milliGRAM(s) SubCutaneous daily  hydroxychloroquine   Oral   hydroxychloroquine 400 milliGRAM(s) Oral every 24 hours  insulin lispro (HumaLOG) corrective regimen sliding scale   SubCutaneous three times a day before meals    MEDICATIONS  (PRN):  acetaminophen   Tablet .. 650 milliGRAM(s) Oral every 4 hours PRN Temp greater or equal to 38.5C (101.3F)  ALBUTerol    90 MICROgram(s) HFA Inhaler 2 Puff(s) Inhalation every 4 hours PRN Shortness of Breath and/or Wheezing  dextrose 40% Gel 15 Gram(s) Oral once PRN Blood Glucose LESS THAN 70 milliGRAM(s)/deciliter  glucagon  Injectable 1 milliGRAM(s) IntraMuscular once PRN Glucose LESS THAN 70 milligrams/deciliter    Vital Signs Last 24 Hrs  T(C): 37.4 (16 Apr 2020 07:35), Max: 38.2 (15 Apr 2020 14:25)  T(F): 99.4 (16 Apr 2020 07:35), Max: 100.7 (15 Apr 2020 14:25)  HR: 70 (16 Apr 2020 07:35) (59 - 70)  BP: 113/75 (16 Apr 2020 07:35) (113/75 - 128/63)  BP(mean): --  RR: 18 (16 Apr 2020 07:35) (17 - 18)  SpO2: 95% (16 Apr 2020 07:35) (90% - 95%)    I&O's Detail    CAPILLARY BLOOD GLUCOSE      POCT Blood Glucose.: 101 mg/dL (16 Apr 2020 11:54)  POCT Blood Glucose.: 92 mg/dL (16 Apr 2020 08:38)  POCT Blood Glucose.: 78 mg/dL (15 Apr 2020 21:38)  POCT Blood Glucose.: 87 mg/dL (15 Apr 2020 16:54)    PHYSICAL EXAM:  GENERAL: NAD, laying in bed  HEAD:  Atraumatic, Normocephalic  EYES: conjunctiva and sclera clear  CHEST/LUNG: Clear to auscultation bilaterally; No wheezing  HEART: Regular rate and rhythm  ABDOMEN: Soft, Nontender, Nondistended; Bowel sounds present  EXTREMITIES:  No clubbing, cyanosis, or edema  PSYCH: AAOx3  NEUROLOGY: non-focal    LABS:                         14.3   2.03  )-----------( 168      ( 16 Apr 2020 07:28 )             39.8     04-16    130<L>  |  92<L>  |  16  ----------------------------<  82  3.5   |  22  |  0.89    Ca    9.0      16 Apr 2020 07:28  Phos  3.0     04-16  Mg     2.0     04-16    TPro  6.7  /  Alb  3.3  /  TBili  0.5  /  DBili  x   /  AST  39  /  ALT  23  /  AlkPhos  31<L>  04-15      RADIOLOGY & ADDITIONAL TESTS: Reviewed.

## 2020-04-16 NOTE — PROGRESS NOTE ADULT - PROBLEM SELECTOR PLAN 6
Mild Hyponatremia  s/p 500 ml of IVF at [facility]  - on exam: AAOx3 on admission , Asymptomatic   - on admission Na 129, now 134 -> 130  - Monitor serum sodium

## 2020-04-17 NOTE — PROGRESS NOTE ADULT - PROBLEM SELECTOR PLAN 1
-CXR with ground glass opacities in left lobe. Pt initially on a NRB but de-escalated to NC  -COVID PCR + at [facility] (Pt has a two MRNs)  -Continue plaquenil course  -Currently on 5L NC, wean as tolerated

## 2020-04-17 NOTE — PROGRESS NOTE ADULT - PROBLEM SELECTOR PLAN 5
Mild Hyponatremia, s/p 500 ml of IVF at [facility]  - on exam: AAOx3 on admission , Asymptomatic   - on admission Na 129, now 134 -> 130 -> 132  - Monitor serum sodium

## 2020-04-18 NOTE — PROGRESS NOTE ADULT - PROBLEM SELECTOR PLAN 1
-CXR with ground glass opacities in left lobe. Pt initially on a NRB but de-escalated to NC  -COVID PCR + at [facility] (Pt has a two MRNs)  -Continue plaquenil course  -Currently on 5L NC, wean as tolerated -CXR with ground glass opacities in left lobe.  pt Now transitioned to NRB as desaturating on NC   -COVID PCR + at [facility] (Pt has a two MRNs)  -Continue plaquenil course  - case d/w clinical Pharmacist and ID as pt with worsening respiratory status and fever, discussed starting Tocilizumab , pt at high risk for complications as patient was recently on chemo, pt also with higher fever now , concern for superadded bacterial infection, will check UA, Ucx, blood cx and CXR, will start Cefepime   c/w Plaquenil

## 2020-04-18 NOTE — PROGRESS NOTE ADULT - SUBJECTIVE AND OBJECTIVE BOX
Patient is a 64y old  Male who presents with a chief complaint of fever (17 Apr 2020 14:04)      SUBJECTIVE / OVERNIGHT EVENTS:    MEDICATIONS  (STANDING):  dextrose 5%. 1000 milliLiter(s) (50 mL/Hr) IV Continuous <Continuous>  dextrose 50% Injectable 12.5 Gram(s) IV Push once  dextrose 50% Injectable 25 Gram(s) IV Push once  dextrose 50% Injectable 25 Gram(s) IV Push once  enoxaparin Injectable 40 milliGRAM(s) SubCutaneous daily  hydroxychloroquine   Oral   hydroxychloroquine 400 milliGRAM(s) Oral every 24 hours  insulin lispro (HumaLOG) corrective regimen sliding scale   SubCutaneous three times a day before meals    MEDICATIONS  (PRN):  acetaminophen   Tablet .. 650 milliGRAM(s) Oral every 4 hours PRN Temp greater or equal to 38.5C (101.3F)  ALBUTerol    90 MICROgram(s) HFA Inhaler 2 Puff(s) Inhalation every 4 hours PRN Shortness of Breath and/or Wheezing  dextrose 40% Gel 15 Gram(s) Oral once PRN Blood Glucose LESS THAN 70 milliGRAM(s)/deciliter  glucagon  Injectable 1 milliGRAM(s) IntraMuscular once PRN Glucose LESS THAN 70 milligrams/deciliter      Vital Signs Last 24 Hrs  T(C): 37.1 (18 Apr 2020 06:50), Max: 37.1 (18 Apr 2020 06:50)  T(F): 98.8 (18 Apr 2020 06:50), Max: 98.8 (18 Apr 2020 06:50)  HR: 69 (18 Apr 2020 06:50) (67 - 69)  BP: 132/80 (18 Apr 2020 06:50) (131/78 - 132/80)  BP(mean): --  RR: 22 (18 Apr 2020 06:50) (18 - 22)  SpO2: 94% (18 Apr 2020 06:55) (86% - 94%)  CAPILLARY BLOOD GLUCOSE      POCT Blood Glucose.: 94 mg/dL (18 Apr 2020 07:41)  POCT Blood Glucose.: 116 mg/dL (17 Apr 2020 21:18)  POCT Blood Glucose.: 99 mg/dL (17 Apr 2020 12:14)    I&O's Summary      PHYSICAL EXAM:  GENERAL: NAD, well-developed  HEAD:  Atraumatic, Normocephalic  EYES: EOMI, PERRLA, conjunctiva and sclera clear  NECK: Supple, No JVD  CHEST/LUNG: Clear to auscultation bilaterally; No wheeze  HEART: Regular rate and rhythm; No murmurs, rubs, or gallops  ABDOMEN: Soft, Nontender, Nondistended; Bowel sounds present  EXTREMITIES:  2+ Peripheral Pulses, No clubbing, cyanosis, or edema  PSYCH: AAOx3  NEUROLOGY: non-focal  SKIN: No rashes or lesions    LABS:                        13.6   3.59  )-----------( 178      ( 18 Apr 2020 04:40 )             38.8     04-18    131<L>  |  93<L>  |  15  ----------------------------<  85  3.2<L>   |  21<L>  |  0.82    Ca    8.6      18 Apr 2020 04:40  Phos  3.2     04-18  Mg     2.0     04-18                RADIOLOGY & ADDITIONAL TESTS:    Imaging Personally Reviewed:    Consultant(s) Notes Reviewed:      Care Discussed with Consultants/Other Providers: Patient is a 64y old  Male who presents with a chief complaint of fever (17 Apr 2020 14:04)      SUBJECTIVE / OVERNIGHT EVENTS: patient seen and examined by bedside, pt with NRB, was desaturating to 80s with NC ,  appears sick, denies SOB, febrile to 101 today       MEDICATIONS  (STANDING):  dextrose 5%. 1000 milliLiter(s) (50 mL/Hr) IV Continuous <Continuous>  dextrose 50% Injectable 12.5 Gram(s) IV Push once  dextrose 50% Injectable 25 Gram(s) IV Push once  dextrose 50% Injectable 25 Gram(s) IV Push once  enoxaparin Injectable 40 milliGRAM(s) SubCutaneous daily  hydroxychloroquine   Oral   hydroxychloroquine 400 milliGRAM(s) Oral every 24 hours  insulin lispro (HumaLOG) corrective regimen sliding scale   SubCutaneous three times a day before meals    MEDICATIONS  (PRN):  acetaminophen   Tablet .. 650 milliGRAM(s) Oral every 4 hours PRN Temp greater or equal to 38.5C (101.3F)  ALBUTerol    90 MICROgram(s) HFA Inhaler 2 Puff(s) Inhalation every 4 hours PRN Shortness of Breath and/or Wheezing  dextrose 40% Gel 15 Gram(s) Oral once PRN Blood Glucose LESS THAN 70 milliGRAM(s)/deciliter  glucagon  Injectable 1 milliGRAM(s) IntraMuscular once PRN Glucose LESS THAN 70 milligrams/deciliter      Vital Signs Last 24 Hrs  T(C): 37.1 (18 Apr 2020 06:50), Max: 37.1 (18 Apr 2020 06:50)  T(F): 98.8 (18 Apr 2020 06:50), Max: 98.8 (18 Apr 2020 06:50)  HR: 69 (18 Apr 2020 06:50) (67 - 69)  BP: 132/80 (18 Apr 2020 06:50) (131/78 - 132/80)  BP(mean): --  RR: 22 (18 Apr 2020 06:50) (18 - 22)  SpO2: 94% (18 Apr 2020 06:55) (86% - 94%)  CAPILLARY BLOOD GLUCOSE      POCT Blood Glucose.: 94 mg/dL (18 Apr 2020 07:41)  POCT Blood Glucose.: 116 mg/dL (17 Apr 2020 21:18)  POCT Blood Glucose.: 99 mg/dL (17 Apr 2020 12:14)    I&O's Summary    PHYSICAL EXAM:  GENERAL: appears sick and tired ,  laying in bed  HEAD:  Atraumatic, Normocephalic  EYES: conjunctiva and sclera clear  CHEST/LUNG: Clear to auscultation bilaterally; No wheezing  HEART: Regular rate and rhythm  ABDOMEN: Soft, Nontender, Nondistended; Bowel sounds present  EXTREMITIES:  No clubbing, cyanosis, or edema  PSYCH: calm  NEUROLOGY: non-focal      LABS:                        13.6   3.59  )-----------( 178      ( 18 Apr 2020 04:40 )             38.8     04-18    131<L>  |  93<L>  |  15  ----------------------------<  85  3.2<L>   |  21<L>  |  0.82    Ca    8.6      18 Apr 2020 04:40  Phos  3.2     04-18  Mg     2.0     04-18                RADIOLOGY & ADDITIONAL TESTS:    Imaging Personally Reviewed:    Consultant(s) Notes Reviewed:      Care Discussed with Consultants/Other Providers: ID

## 2020-04-18 NOTE — PROGRESS NOTE ADULT - PROBLEM SELECTOR PLAN 6
-Hold oral hypoglycemics Metformin   ISS for now  FS: Before meals and at bedtime  f/u Hba1c -Hold oral hypoglycemics Metformin   ISS for now  FS: Before meals and at bedtime   Hba1c 6.3

## 2020-04-18 NOTE — PROGRESS NOTE ADULT - PROBLEM SELECTOR PLAN 5
Mild Hyponatremia, s/p 500 ml of IVF at [facility]  - on exam: AAOx3 on admission , Asymptomatic   - on admission Na 129, now 134 -> 130 -> 132  - Monitor serum sodium Mild Hyponatremia, s/p 500 ml of IVF at [facility]  - on exam: AAOx3 on admission , Asymptomatic   - on admission Na 129, now 134 -> 130 -> 132-->.131  - Monitor serum sodium

## 2020-04-18 NOTE — PROGRESS NOTE ADULT - PROBLEM SELECTOR PLAN 4
-leukopenic and neutropenic (ANC of 1,1000 ), thrombocytopenia all likely 2/2 to bone marrow suppression in the setting of active chemotherapy  -continue to trend -leukopenic and neutropenic (ANC of 1,1000 ), thrombocytopenia all likely 2/2 to bone marrow suppression in the setting of active chemotherapy  -counts improving   -continue to trend

## 2020-04-19 NOTE — PROGRESS NOTE ADULT - SUBJECTIVE AND OBJECTIVE BOX
Patient is a 64y old  Male who presents with a chief complaint of fever (2020 09:58)      SUBJECTIVE / OVERNIGHT EVENTS:    MEDICATIONS  (STANDING):  cefepime   IVPB      cefepime   IVPB 1000 milliGRAM(s) IV Intermittent every 8 hours  dextrose 5%. 1000 milliLiter(s) (50 mL/Hr) IV Continuous <Continuous>  dextrose 50% Injectable 12.5 Gram(s) IV Push once  dextrose 50% Injectable 25 Gram(s) IV Push once  dextrose 50% Injectable 25 Gram(s) IV Push once  enoxaparin Injectable 40 milliGRAM(s) SubCutaneous daily  insulin lispro (HumaLOG) corrective regimen sliding scale   SubCutaneous three times a day before meals    MEDICATIONS  (PRN):  acetaminophen   Tablet .. 650 milliGRAM(s) Oral every 4 hours PRN Temp greater or equal to 38.5C (101.3F)  ALBUTerol    90 MICROgram(s) HFA Inhaler 2 Puff(s) Inhalation every 4 hours PRN Shortness of Breath and/or Wheezing  dextrose 40% Gel 15 Gram(s) Oral once PRN Blood Glucose LESS THAN 70 milliGRAM(s)/deciliter  glucagon  Injectable 1 milliGRAM(s) IntraMuscular once PRN Glucose LESS THAN 70 milligrams/deciliter      Vital Signs Last 24 Hrs  T(C): 36.9 (2020 10:32), Max: 37.2 (2020 14:58)  T(F): 98.4 (2020 10:32), Max: 98.9 (2020 14:58)  HR: 72 (2020 10:32) (70 - 73)  BP: 133/86 (2020 10:32) (127/78 - 133/86)  BP(mean): --  RR: 20 (2020 10:32) (20 - 22)  SpO2: 94% (2020 10:32) (93% - 98%)  CAPILLARY BLOOD GLUCOSE      POCT Blood Glucose.: 96 mg/dL (2020 07:53)  POCT Blood Glucose.: 99 mg/dL (2020 21:05)  POCT Blood Glucose.: 100 mg/dL (2020 16:35)  POCT Blood Glucose.: 100 mg/dL (2020 11:54)    I&O's Summary      PHYSICAL EXAM:  GENERAL: NAD, well-developed  HEAD:  Atraumatic, Normocephalic  EYES: EOMI, PERRLA, conjunctiva and sclera clear  NECK: Supple, No JVD  CHEST/LUNG: Clear to auscultation bilaterally; No wheeze  HEART: Regular rate and rhythm; No murmurs, rubs, or gallops  ABDOMEN: Soft, Nontender, Nondistended; Bowel sounds present  EXTREMITIES:  2+ Peripheral Pulses, No clubbing, cyanosis, or edema  PSYCH: AAOx3  NEUROLOGY: non-focal  SKIN: No rashes or lesions    LABS:                        14.1   4.00  )-----------( 174      ( 2020 05:40 )             39.9     04-19    131<L>  |  95<L>  |  16  ----------------------------<  98  3.4<L>   |  18<L>  |  0.80    Ca    8.9      2020 05:40  Phos  3.2     04-18  Mg     1.9     04-19            Urinalysis Basic - ( 2020 19:00 )    Color: YELLOW / Appearance: CLEAR / S.027 / pH: 6.5  Gluc: NEGATIVE / Ketone: SMALL  / Bili: NEGATIVE / Urobili: NORMAL   Blood: TRACE / Protein: 200 / Nitrite: NEGATIVE   Leuk Esterase: NEGATIVE / RBC: 0-2 / WBC 3-5   Sq Epi: OCC / Non Sq Epi: x / Bacteria: NEGATIVE        RADIOLOGY & ADDITIONAL TESTS:    Imaging Personally Reviewed:    Consultant(s) Notes Reviewed:      Care Discussed with Consultants/Other Providers: Patient is a 64y old  Male who presents with a chief complaint of fever (2020 09:58)      SUBJECTIVE / OVERNIGHT EVENTS: patient seen and examined by bedside, on NRB mask, saturating 93-96%, pt afebrile today , more alert and awake, c/o Loose BM   pt denies headache, dizziness, SOB, CP, Palpitations , N/V/, abdominal pain  pt says he has to get from here          MEDICATIONS  (STANDING):  cefepime   IVPB      cefepime   IVPB 1000 milliGRAM(s) IV Intermittent every 8 hours  dextrose 5%. 1000 milliLiter(s) (50 mL/Hr) IV Continuous <Continuous>  dextrose 50% Injectable 12.5 Gram(s) IV Push once  dextrose 50% Injectable 25 Gram(s) IV Push once  dextrose 50% Injectable 25 Gram(s) IV Push once  enoxaparin Injectable 40 milliGRAM(s) SubCutaneous daily  insulin lispro (HumaLOG) corrective regimen sliding scale   SubCutaneous three times a day before meals    MEDICATIONS  (PRN):  acetaminophen   Tablet .. 650 milliGRAM(s) Oral every 4 hours PRN Temp greater or equal to 38.5C (101.3F)  ALBUTerol    90 MICROgram(s) HFA Inhaler 2 Puff(s) Inhalation every 4 hours PRN Shortness of Breath and/or Wheezing  dextrose 40% Gel 15 Gram(s) Oral once PRN Blood Glucose LESS THAN 70 milliGRAM(s)/deciliter  glucagon  Injectable 1 milliGRAM(s) IntraMuscular once PRN Glucose LESS THAN 70 milligrams/deciliter      Vital Signs Last 24 Hrs  T(C): 36.9 (2020 10:32), Max: 37.2 (2020 14:58)  T(F): 98.4 (2020 10:32), Max: 98.9 (2020 14:58)  HR: 72 (2020 10:32) (70 - 73)  BP: 133/86 (2020 10:32) (127/78 - 133/86)  BP(mean): --  RR: 20 (2020 10:32) (20 - 22)  SpO2: 94% (2020 10:32) (93% - 98%)  CAPILLARY BLOOD GLUCOSE      POCT Blood Glucose.: 96 mg/dL (2020 07:53)  POCT Blood Glucose.: 99 mg/dL (2020 21:05)  POCT Blood Glucose.: 100 mg/dL (2020 16:35)  POCT Blood Glucose.: 100 mg/dL (2020 11:54)    I&O's Summary      PHYSICAL EXAM:  GENERAL: NAD, well-developed  HEAD:  Atraumatic, Normocephalic  EYES: EOMI, PERRLA, conjunctiva and sclera clear  CHEST/LUNG: lower lobes crackles bilaterally; No wheeze  HEART: Regular rate and rhythm;   ABDOMEN: Soft, Nontender, Nondistended; Bowel sounds present  EXTREMITIES:   No clubbing, cyanosis, or edema  PSYCH: AAOx3, anxious slightly   NEUROLOGY: non-focal      LABS:                        14.1   4.00  )-----------( 174      ( 2020 05:40 )             39.9     04-19    131<L>  |  95<L>  |  16  ----------------------------<  98  3.4<L>   |  18<L>  |  0.80    Ca    8.9      2020 05:40  Phos  3.2     04-18  Mg     1.9     04-19            Urinalysis Basic - ( 2020 19:00 )    Color: YELLOW / Appearance: CLEAR / S.027 / pH: 6.5  Gluc: NEGATIVE / Ketone: SMALL  / Bili: NEGATIVE / Urobili: NORMAL   Blood: TRACE / Protein: 200 / Nitrite: NEGATIVE   Leuk Esterase: NEGATIVE / RBC: 0-2 / WBC 3-5   Sq Epi: OCC / Non Sq Epi: x / Bacteria: NEGATIVE        RADIOLOGY & ADDITIONAL TESTS:    Imaging Personally Reviewed:  < from: Xray Chest 1 View- PORTABLE-Urgent (20 @ 15:03) >  FINDINGS:   It is difficult toaccurately assess heart size on this projection  Pulmonary vascularity is normal.  Patchy opacities seen in both lungs peripherally - left lung more than right. Findings consistent with multifocal pneumonia - atypical pneumonia such as Covid-19 not excluded.  No large pleural effusion. No pneumothorax.  The bony structures are intact.    IMPRESSION:   Bilateral patchy opacities -consistent with multifocal pneumonia - consistent with such entities as Covid-19.      < end of copied text >    Consultant(s) Notes Reviewed:      Care Discussed with Consultants/Other Providers:

## 2020-04-19 NOTE — PROGRESS NOTE ADULT - PROBLEM SELECTOR PLAN 1
-CXR with ground glass opacities in left lobe.  pt Now transitioned to NRB as desaturating on NC   -COVID PCR + at [facility] (Pt has a two MRNs)  -Continue plaquenil course  - case d/w clinical Pharmacist and ID as pt with worsening respiratory status and fever, discussed starting Tocilizumab , pt at high risk for complications as patient was recently on chemo, pt also with higher fever now , concern for superadded bacterial infection, will check UA, Ucx, blood cx and CXR, will start Cefepime   c/w Plaquenil -CXR with ground glass opacities in left lobe.  pt Now transitioned to NRB as desaturating on NC   -COVID PCR + at [facility] (Pt has a two MRNs)  -Completed  Plaquenil course  - case d/w clinical Pharmacist and ID on 4/18  as pt with worsening respiratory status and fever, discussed starting Tocilizumab , pt at high risk for complications as patient was recently on chemo,  , concern for superadded bacterial infection, will UA wnl ,  f/u Ucx, blood cx ,  CXR noted with multifocal pneumonia, likely secondary to COVID   ,- c/w  Cefepime   - pt afebrile today, will monitor pulse ox and wean as tolerated  pt c/o loose VM, will check for Cdiff, if negative, will consider adding imodium

## 2020-04-19 NOTE — PROGRESS NOTE ADULT - PROBLEM SELECTOR PLAN 5
Mild Hyponatremia, s/p 500 ml of IVF at [facility]  - on exam: AAOx3 on admission , Asymptomatic   - on admission Na 129, now 134 -> 130 -> 132-->.131  - Monitor serum sodium Mild Hyponatremia, s/p 500 ml of IVF at [facility]  - on exam: AAOx3 on admission , Asymptomatic   - on admission Na 129, now 134 -> 130 -> 132-->.131  - Monitor serum sodium  hypokalemia; will supplement , monitor BMP

## 2020-04-19 NOTE — PROGRESS NOTE ADULT - PROBLEM SELECTOR PLAN 4
-leukopenic and neutropenic (ANC of 1,1000 ), thrombocytopenia all likely 2/2 to bone marrow suppression in the setting of active chemotherapy  -counts improving   -continue to trend

## 2020-04-20 NOTE — PROGRESS NOTE ADULT - PROBLEM SELECTOR PLAN 6
-Hold oral hypoglycemics Metformin   ISS for now  FS: Before meals and at bedtime   Hba1c 6.3 - Mild Hyponatremia, s/p 500 ml of IVF at [facility]  - on admission Na 129, now 134 -> 130 -> 132-->131 --> 132  - Monitor serum sodium, check urine lytes/urine osm likely SIADH mediated in setting of acute COVID   - Hypokalemia: will supplement , monitor BMP

## 2020-04-20 NOTE — PROGRESS NOTE ADULT - PROBLEM SELECTOR PLAN 2
-follows with [facility]  -will hold outpt chemotherapy given acute infection - Follows with [facility]  - Will hold outpt chemotherapy given acute infection  - Prior hospitalist discussed care with Dr. Ashford  from [facility] at [phone]

## 2020-04-20 NOTE — PROGRESS NOTE ADULT - PROBLEM SELECTOR PLAN 4
-leukopenic and neutropenic (ANC of 1,1000 ), thrombocytopenia all likely 2/2 to bone marrow suppression in the setting of active chemotherapy  -counts improving   -continue to trend - Soft BPs at [facility], hold ARB, BP stable at this time

## 2020-04-20 NOTE — PROGRESS NOTE ADULT - PROBLEM SELECTOR PLAN 7
DVT ppx: lovenox -Hold oral hypoglycemics Metformin   - ISS for now  - FS: Before meals and at bedtime  - Hba1c 6.3

## 2020-04-20 NOTE — PROGRESS NOTE ADULT - SUBJECTIVE AND OBJECTIVE BOX
Medicine Progress Note  Maryuri Galvan DO  Pager # [phone]     Patient is a 64y old  Male who presents with a chief complaint of fever (2020 11:05)    SUBJECTIVE / OVERNIGHT EVENTS: Pt very defiant, states that he does NOT feel SOB and doesn't understand why everyone is concerned about his breathing.  He is resistant to lying prone, states that it's uncomfortable.  Continue to have watery diarrhea and soiled himself, denies fevers/chills, N/V, abd pain, denies CP.  Last Tm 101 @ 10am .  Per RN, pt is often taking his NRB off.     MEDICATIONS  (STANDING):  cefepime   IVPB      cefepime   IVPB 1000 milliGRAM(s) IV Intermittent every 8 hours  dextrose 5%. 1000 milliLiter(s) (50 mL/Hr) IV Continuous <Continuous>  dextrose 50% Injectable 12.5 Gram(s) IV Push once  dextrose 50% Injectable 25 Gram(s) IV Push once  dextrose 50% Injectable 25 Gram(s) IV Push once  enoxaparin Injectable 40 milliGRAM(s) SubCutaneous daily  insulin lispro (HumaLOG) corrective regimen sliding scale   SubCutaneous three times a day before meals    MEDICATIONS  (PRN):  acetaminophen   Tablet .. 650 milliGRAM(s) Oral every 4 hours PRN Temp greater or equal to 38.5C (101.3F)  ALBUTerol    90 MICROgram(s) HFA Inhaler 2 Puff(s) Inhalation every 4 hours PRN Shortness of Breath and/or Wheezing  dextrose 40% Gel 15 Gram(s) Oral once PRN Blood Glucose LESS THAN 70 milliGRAM(s)/deciliter  glucagon  Injectable 1 milliGRAM(s) IntraMuscular once PRN Glucose LESS THAN 70 milligrams/deciliter    CAPILLARY BLOOD GLUCOSE      POCT Blood Glucose.: 105 mg/dL (2020 08:22)  POCT Blood Glucose.: 93 mg/dL (2020 20:55)  POCT Blood Glucose.: 101 mg/dL (2020 17:41)    PHYSICAL EXAM:  Vital Signs Last 24 Hrs  T(C): 36.7 (2020 21:20), Max: 36.7 (2020 21:20)  T(F): 98.1 (2020 21:20), Max: 98.1 (2020 21:20)  HR: 75 (2020 21:20) (75 - 75)  BP: 137/77 (2020 21:20) (137/77 - 137/77)  BP(mean): --  RR: 19 (2020 21:20) (19 - 22)  SpO2: 97% (2020 21:20) (93% - 97%)    CONSTITUTIONAL: Mild respiratory distress, fatigued   RESPIRATORY: Speaking in full sentences, decreased breath sounds b/l bases   CARDIOVASCULAR: No lower extremity edema  ABDOMEN: Nontender to palpation  PSYCH: A+O to person, place, and time  NEUROLOGY: No gross motor/sensory deficits   SKIN: No rashes; no palpable lesions    LABS:                        15.2   5.78  )-----------( 190      ( 2020 11:20 )             43.1         131<L>  |  95<L>  |  16  ----------------------------<  98  3.4<L>   |  18<L>  |  0.80    Ca    8.9      2020 05:40  Mg     1.9         Urinalysis Basic - ( 2020 19:00 )    Color: YELLOW / Appearance: CLEAR / S.027 / pH: 6.5  Gluc: NEGATIVE / Ketone: SMALL  / Bili: NEGATIVE / Urobili: NORMAL   Blood: TRACE / Protein: 200 / Nitrite: NEGATIVE   Leuk Esterase: NEGATIVE / RBC: 0-2 / WBC 3-5   Sq Epi: OCC / Non Sq Epi: x / Bacteria: NEGATIVE    Culture - Blood (collected 2020 20:53)  Source: .Blood Blood-Peripheral  Preliminary Report (2020 21:01):    No growth to date.    Culture - Urine (collected 2020 19:50)  Source: .Urine Clean Catch (Midstream)  Final Report (2020 22:11):    <10,000 CFU/mL Normal Urogenital Justine    Tested positive for COVID at [facility]    Ferritin, Serum in AM (20 @ 05:40)    Ferritin, Serum: 2496 ng/mL    Procalcitonin, Serum (04.15.20 @ 06:22)    Procalcitonin, Serum: 0.13: Procalcitonin (PCT) Interpretation (ng/mL) - Diagnosis of  systemic bacterial infection/sepsis:  PCT < 0.5: Systemic infection (sepsis) is not likely and  risk for progression to severe systemic infection is low.  Local bacterial infection is possible. If early sepsis is  suspected clinically, PCT should be re-assessed in 6-24  hours.  PCT >/= 0.5 but < 2.0: Systemic infection (sepsis) is  possible, but other conditions are known to elevate PCT as  well. Moderate risk for progression to severe systemic  infection. The patient should be closely monitored both  clinically and by re-assessing PCT within 6-24 hours.  PCT >/= 2.0 but < 10.0: Systemic infection (sepsis) is  likely, unless other causes are known. High risk of  progression to severe systemic infection (severe  sepsis/septic shock).  PCT >/= 10.0: Important systemic inflammatory response,  almost exclusively due to severe bacterial sepsis or septic  shock. High likelihood of severe sepsis or septic shock. ng/mL    C-Reactive Protein, Serum (20 @ 11:20)    C-Reactive Protein, Serum: 170.9 mg/L    D-Dimer Assay, Quantitative (20 @ 11:20)    D-Dimer Assay, Quantitative: 570: A result less than 230 ng/mL DDU correlates with the absence  of thrombosis in a patient with low and moderate pre-test  probability of thrombosis.    Note: 1 ng/ml DDU   2 ng/ml FEU  If you have any questions, please contact Hematology Lab at  ext. [phone]. ng/mL    RADIOLOGY & ADDITIONAL TESTS:  Imaging from Last 24 Hours:  < from: Xray Chest 1 View- PORTABLE-Urgent (20 @ 15:03) >  IMPRESSION:   Bilateral patchy opacities -consistent with multifocal pneumonia - consistent with such entities as Covid-19.      Electrocardiogram/QTc Interval:     COORDINATION OF CARE:  Care Discussed with Consultants/Other Providers: Medicine Progress Note  Maryuri Galvan DO  Pager # [phone]     Patient is a 64y old  Male who presents with a chief complaint of fever (2020 11:05)    SUBJECTIVE / OVERNIGHT EVENTS: Pt very defiant, states that he does NOT feel SOB and doesn't understand why everyone is concerned about his breathing.  He is resistant to lying prone, states that it's uncomfortable.  Continue to have watery diarrhea and soiled himself, denies fevers/chills, N/V, abd pain, denies CP.  Last Tm 101 @ 10am .  Per RN, pt is often taking his NRB off.     MEDICATIONS  (STANDING):  cefepime   IVPB      cefepime   IVPB 1000 milliGRAM(s) IV Intermittent every 8 hours  dextrose 5%. 1000 milliLiter(s) (50 mL/Hr) IV Continuous <Continuous>  dextrose 50% Injectable 12.5 Gram(s) IV Push once  dextrose 50% Injectable 25 Gram(s) IV Push once  dextrose 50% Injectable 25 Gram(s) IV Push once  enoxaparin Injectable 40 milliGRAM(s) SubCutaneous daily  insulin lispro (HumaLOG) corrective regimen sliding scale   SubCutaneous three times a day before meals    MEDICATIONS  (PRN):  acetaminophen   Tablet .. 650 milliGRAM(s) Oral every 4 hours PRN Temp greater or equal to 38.5C (101.3F)  ALBUTerol    90 MICROgram(s) HFA Inhaler 2 Puff(s) Inhalation every 4 hours PRN Shortness of Breath and/or Wheezing  dextrose 40% Gel 15 Gram(s) Oral once PRN Blood Glucose LESS THAN 70 milliGRAM(s)/deciliter  glucagon  Injectable 1 milliGRAM(s) IntraMuscular once PRN Glucose LESS THAN 70 milligrams/deciliter    CAPILLARY BLOOD GLUCOSE      POCT Blood Glucose.: 105 mg/dL (2020 08:22)  POCT Blood Glucose.: 93 mg/dL (2020 20:55)  POCT Blood Glucose.: 101 mg/dL (2020 17:41)    PHYSICAL EXAM:  Vital Signs Last 24 Hrs  T(C): 36.7 (2020 21:20), Max: 36.7 (2020 21:20)  T(F): 98.1 (2020 21:20), Max: 98.1 (2020 21:20)  HR: 75 (2020 21:20) (75 - 75)  BP: 137/77 (2020 21:20) (137/77 - 137/77)  BP(mean): --  RR: 19 (2020 21:20) (19 - 22)  SpO2: 97% (2020 21:20) (93% - 97%)    CONSTITUTIONAL: Mild respiratory distress, fatigued   RESPIRATORY: Speaking in full sentences, decreased breath sounds b/l bases   CARDIOVASCULAR: No lower extremity edema  ABDOMEN: Nontender to palpation  PSYCH: A+O to person, place, and time  NEUROLOGY: No gross motor/sensory deficits   SKIN: No rashes; no palpable lesions    LABS:                        15.2   5.78  )-----------( 190      ( 2020 11:20 )             43.1         131<L>  |  95<L>  |  16  ----------------------------<  98  3.4<L>   |  18<L>  |  0.80    Ca    8.9      2020 05:40  Mg     1.9         Urinalysis Basic - ( 2020 19:00 )    Color: YELLOW / Appearance: CLEAR / S.027 / pH: 6.5  Gluc: NEGATIVE / Ketone: SMALL  / Bili: NEGATIVE / Urobili: NORMAL   Blood: TRACE / Protein: 200 / Nitrite: NEGATIVE   Leuk Esterase: NEGATIVE / RBC: 0-2 / WBC 3-5   Sq Epi: OCC / Non Sq Epi: x / Bacteria: NEGATIVE    Culture - Blood (collected 2020 20:53)  Source: .Blood Blood-Peripheral  Preliminary Report (2020 21:01):    No growth to date.    Culture - Urine (collected 2020 19:50)  Source: .Urine Clean Catch (Midstream)  Final Report (2020 22:11):    <10,000 CFU/mL Normal Urogenital Justine    Tested [facility] at [facility]    Ferritin, Serum in AM (20 @ 05:40)    Ferritin, Serum: 2496 ng/mL    Procalcitonin, Serum (04.15.20 @ 06:22)    Procalcitonin, Serum: 0.13: Procalcitonin (PCT) Interpretation (ng/mL) - Diagnosis of  systemic bacterial infection/sepsis:  PCT < 0.5: Systemic infection (sepsis) is not likely and  risk for progression to severe systemic infection is low.  Local bacterial infection is possible. If early sepsis is  suspected clinically, PCT should be re-assessed in 6-24  hours.  PCT >/= 0.5 but < 2.0: Systemic infection (sepsis) is  possible, but other conditions are known to elevate PCT as  well. Moderate risk for progression to severe systemic  infection. The patient should be closely monitored both  clinically and by re-assessing PCT within 6-24 hours.  PCT >/= 2.0 but < 10.0: Systemic infection (sepsis) is  likely, unless other causes are known. High risk of  progression to severe systemic infection (severe  sepsis/septic shock).  PCT >/= 10.0: Important systemic inflammatory response,  almost exclusively due to severe bacterial sepsis or septic  shock. High likelihood of severe sepsis or septic shock. ng/mL    C-Reactive Protein, Serum (20 @ 11:20)    C-Reactive Protein, Serum: 170.9 mg/L    D-Dimer Assay, Quantitative (20 @ 11:20)    D-Dimer Assay, Quantitative: 570: A result less than 230 ng/mL DDU correlates with the absence  of thrombosis in a patient with low and moderate pre-test  probability of thrombosis.    Note: 1 ng/ml DDU   2 ng/ml FEU  If you have any questions, please contact Hematology Lab at  ext. [phone]. ng/mL    RADIOLOGY & ADDITIONAL TESTS:  Imaging from Last 24 Hours:  < from: Xray Chest 1 View- PORTABLE-Urgent (20 @ 15:03) >  IMPRESSION:   Bilateral patchy opacities -consistent with multifocal pneumonia - consistent with such entities as Covid-19.    COORDINATION OF CARE:  Care Discussed with Consultants/Other Providers: ID Dr. Grajeda

## 2020-04-20 NOTE — PROGRESS NOTE ADULT - PROBLEM SELECTOR PLAN 1
- CXR with ground glass opacities in left lobe.  Pt remains on 100% NRB, per discussion with RN, pt desaturates on 6L to mid 80s.   - COVID PCR + at [facility] (Pt has two MRNs)  - Completed Plaquenil course  - case d/w clinical Pharmacist and ID on 4/18  as pt with worsening respiratory status and fever, discussed starting Tocilizumab , pt at high risk for complications as patient was recently on chemo,  , concern for superadded bacterial infection, will UA wnl ,  f/u Ucx, blood cx ,  CXR noted with multifocal pneumonia, likely secondary to COVID   ,- c/w  Cefepime   - pt afebrile today, will monitor pulse ox and wean as tolerated  pt c/o loose VM, will check for Cdiff, if negative, will consider adding imodium - CXR with ground glass opacities in left lobe.  Pt remains on 100% NRB, per discussion with RN, pt desaturates on 6L to mid 80s.   - COVID PCR + at [facility] (Pt has two MRNs)  - Completed Plaquenil course  - Case d/w ID on 4/20 as pt with worsening respiratory status, rising inflammatory markers.  Discussed starting Tocilizumab, though pt at high risk for complications as patient was recently on chemo, prior hospitalist discussed with pt's [facility] hematologist who stated lymphoma is indolent and it is OK to initiate Tocilizumab given his worsening status.  Will hold dosing Toci for now until Cdiff results   - Per discussion with ID, will dc IV Cefepime due to possible concern of Cdiff.   - Bcx/UA/UCx are negative.    - Continue to trend CRP/Ferritin/D-dimer q48 hours (next due 4/22)

## 2020-04-20 NOTE — CHART NOTE - NSCHARTNOTEFT_GEN_A_CORE
Nurse called concerned over patient desaturation. Patient has had an increase in oxygen demand over the last couple hours.  Patient doesn't like to be prone, but agrees to try to lie on his side. Nurse called concerned over patient desaturation. Patient has had an increase in oxygen demand over the last couple hours.  Patient doesn't like to be prone, but agrees to try to try laying on his side.  Exam:  Gen: alert, oriented x4, no acute distress  Lungs: good air movement, mild tachypnea.    S/W nurse regarding chest PT, patient currently stable at 90% on 15L NRB.   Discussed with nurse to call if patient to desat after those interventions.  Of note wife called tonight and stated patient's father has a hx of Crohn's and passed away d/t c-diff.  Discussed with patient and he denies ever being diagnosed with Crohn's or colitis. Stool sample sent for culture, O&P, GI PCR and c-diff

## 2020-04-20 NOTE — PROGRESS NOTE ADULT - PROBLEM SELECTOR PLAN 5
Mild Hyponatremia, s/p 500 ml of IVF at Counts include 234 beds at the [facility]  - on exam: AAOx3 on admission , Asymptomatic   - on admission Na 129, now 134 -> 130 -> 132-->.131  - Monitor serum sodium  hypokalemia; will supplement , monitor BMP - Leukopenic and thrombocytopenic 2/2 to bone marrow suppression in the setting of active chemotherapy  - Counts improving   - Continue to trend

## 2020-04-21 NOTE — PROGRESS NOTE ADULT - PROBLEM SELECTOR PLAN 1
- CXR with ground glass opacities in left lobe.  Pt remains on 100% NRB, per discussion with RN, pt desaturates on 6L to mid 80s.   - COVID PCR + at [facility] (Pt has two MRNs)  - Completed Plaquenil course  - Case d/w ID on 4/20 as pt with worsening respiratory status, rising inflammatory markers.  Discussed starting Tocilizumab, though pt at high risk for complications as patient was recently on chemo, prior hospitalist discussed with pt's [facility] hematologist who stated lymphoma is indolent and it is OK to initiate Tocilizumab given his worsening status.    - Tocilizumab 400mg IV x1 dosed today  - Bcx/UA/UCx are negative.  Cdiff negative   - Continue to trend CRP/Ferritin/D-dimer q48 hours (next due 4/22)  - ABG checked: borderline respiratory alkalosis; given pt's lethargy and increased work of breathing and use of accessory muscles, will consult ICU

## 2020-04-21 NOTE — CHART NOTE - NSCHARTNOTEFT_GEN_A_CORE
ELECTROPHYSIOLOGY      Patient on [employer] MCOT.  Review :  No atrial fibrillation. No ventricular tachycardia.  NSR. with heart rate trend in the 80's.  QTc 464ms  Patient has completed hydroxychloroquine.   Will remove MCOT in AM.

## 2020-04-21 NOTE — PROGRESS NOTE ADULT - PROBLEM SELECTOR PLAN 6
- Mild Hyponatremia, s/p 500 ml of IVF at [facility]  - on admission Na 129, now 134 -> 130 -> 132-->131 --> 132 --> 135  - Urine studies reviewed, suggestive of prerenal/hypovolemic state, will hold IVF for now as Na improved

## 2020-04-21 NOTE — PROGRESS NOTE ADULT - PROBLEM SELECTOR PLAN 2
- Follows with [facility]  - Will hold outpt chemotherapy given acute infection  - Prior hospitalist discussed care with Dr. Ashford  from [facility] at [phone]

## 2020-04-21 NOTE — CONSULT NOTE ADULT - SUBJECTIVE AND OBJECTIVE BOX
65 y/o male PMH of B Cell Lymphoma on treatment at [place], HTN, DM, been hospitalized since 4/15 w/ fever, chills, cough since 4/7/20 per chart admitted w/ covid. MICU Consulted early afternoon 4/21/20 for hypoxia and persistent tachypnea. Patient seen at the bedside. Patient alert, A+Ox3, but slow to answer where he is located. Patient currently states he feels okay. Denies any SOB, N/V/D, abd pain, chest pain. States that he is just increadibly thirsty. Patient completed a course of Plaquinil and got a dose of Tocilizumab today as well.    Constitution: + Fever  Eyes: No visual changes  HEENT: No URI symptoms  Cardio: No Chest pain  Resp: + hypoxia, cough  GI: No abdominal pain  : No dysuria  MSK: No Back pain  Neuro: No Headache  Skin: No rashes     General: Alert and Orientated x 3. No apparent distress.  Head: Normocephalic and atraumatic.  Eyes: PERRLA with EOMI.  Neck: Supple. Trachea midline.   Cardiac: RRR. No murmurs appreciated.  Pulmonary: Coarse breath sounds. Occasional cough. 94% SpO2 sitting upright. On NRB  Abdominal: Soft, non-tender. (+) bowel sounds appreciated in all 4 quadrants.   Neurologic: No focal sensory or motor deficits.  Musculoskeletal: Strength appropriate in all 4 extremities for age with no limited ROM.  Skin: Color appropriate for race. Intact, warm, and well-perfused.    Plan: 65 y/o Covid positive, MICU consulted for tachypnea and hypoxia.   Patient saturating well, mild tachypnea, but saturating >93% on NRB. D/w hospitalist no indication for MICU care at this time or advanced airway. Recommend continuing current care, proning if needed, and oxygen therapy. Patient A+Ox3, not altered at this time. No indication for MICU Care.  Recommend reconsulting if any worsening conditions. 63 y/o male PMH of B Cell Lymphoma on treatment at [place], HTN, DM, been hospitalized since 4/15 w/ fever, chills, cough since 4/7/20 per chart admitted w/ covid. MICU Consulted early afternoon 4/21/20 for hypoxia and persistent tachypnea. Patient seen at the bedside. Patient alert, A+Ox3, but slow to answer where he is located. Patient currently states he feels okay. Denies any SOB, N/V/D, abd pain, chest pain. States that he is just increadibly thirsty. Patient completed a course of Plaquinil and got a dose of Tocilizumab today as well.    Constitution: + Fever  Eyes: No visual changes  HEENT: No URI symptoms  Cardio: No Chest pain  Resp: + hypoxia, cough  GI: No abdominal pain  : No dysuria  MSK: No Back pain  Neuro: No Headache  Skin: No rashes     General: Alert and Orientated x 3. No apparent distress.  Head: Normocephalic and atraumatic.  Eyes: PERRLA with EOMI.  Neck: Supple. Trachea midline.   Cardiac: RRR. No murmurs appreciated.  Pulmonary: Coarse breath sounds. Occasional cough. 94% SpO2 sitting upright. On NRB  Abdominal: Soft, non-tender. (+) bowel sounds appreciated in all 4 quadrants.   Neurologic: No focal sensory or motor deficits.  Musculoskeletal: Strength appropriate in all 4 extremities for age with no limited ROM.  Skin: Color appropriate for race. Intact, warm, and well-perfused.    labs and imaging reviewed and interpreted by me

## 2020-04-21 NOTE — PROGRESS NOTE ADULT - PROBLEM SELECTOR PLAN 5
- Leukopenic and thrombocytopenic 2/2 to bone marrow suppression in the setting of active chemotherapy  - Counts improving   - Continue to trend

## 2020-04-21 NOTE — PROGRESS NOTE ADULT - SUBJECTIVE AND OBJECTIVE BOX
Medicine Progress Note  Maryuri Galvan   Pager # [phone]     Patient is a 64y old  Male who presents with a chief complaint of fever (20 Apr 2020 12:16)    SUBJECTIVE / OVERNIGHT EVENTS: Pt lethargic, able to respond to questions when repeatedly asked, but quickly falls asleep again.  Denies CP/abd pain, still remains defiant and refuses to stay prone due to discomfort.      MEDICATIONS  (STANDING):  dextrose 5%. 1000 milliLiter(s) (50 mL/Hr) IV Continuous <Continuous>  dextrose 50% Injectable 12.5 Gram(s) IV Push once  dextrose 50% Injectable 25 Gram(s) IV Push once  dextrose 50% Injectable 25 Gram(s) IV Push once  enoxaparin Injectable 40 milliGRAM(s) SubCutaneous daily  insulin lispro (HumaLOG) corrective regimen sliding scale   SubCutaneous three times a day before meals  tocilizumab IVPB 400 milliGRAM(s) IV Intermittent once    MEDICATIONS  (PRN):  acetaminophen   Tablet .. 650 milliGRAM(s) Oral every 6 hours PRN Temp greater or equal to 38C (100.4F)  ALBUTerol    90 MICROgram(s) HFA Inhaler 2 Puff(s) Inhalation every 4 hours PRN Shortness of Breath and/or Wheezing  dextrose 40% Gel 15 Gram(s) Oral once PRN Blood Glucose LESS THAN 70 milliGRAM(s)/deciliter  glucagon  Injectable 1 milliGRAM(s) IntraMuscular once PRN Glucose LESS THAN 70 milligrams/deciliter    CAPILLARY BLOOD GLUCOSE  POCT Blood Glucose.: 130 mg/dL (21 Apr 2020 12:18)  POCT Blood Glucose.: 110 mg/dL (21 Apr 2020 08:18)  POCT Blood Glucose.: 120 mg/dL (20 Apr 2020 17:04)    I&O's Summary    20 Apr 2020 07:01  -  21 Apr 2020 07:00  --------------------------------------------------------  IN: 0 mL / OUT: 300 mL / NET: -300 mL        PHYSICAL EXAM:  Vital Signs Last 24 Hrs  T(C): 37.1 (21 Apr 2020 12:44), Max: 38 (21 Apr 2020 06:10)  T(F): 98.7 (21 Apr 2020 12:44), Max: 100.4 (21 Apr 2020 06:10)  HR: 94 (21 Apr 2020 12:44) (80 - 94)  BP: 131/82 (21 Apr 2020 12:44) (131/82 - 138/74)  BP(mean): --  RR: 20 (21 Apr 2020 12:44) (19 - 21)  SpO2: 91% (21 Apr 2020 12:44) (91% - 96%)    CONSTITUTIONAL: Moderate respiratory distress on 100% NRB, malaised   RESPIRATORY: +tachypneic, using accessory muscles, speaking in full sentences  CARDIOVASCULAR: No lower extremity edema  ABDOMEN: Nontender to palpation, no rebound/guarding  PSYCH: A+O to person, place; lethargic at times   NEUROLOGY: CN 2-12 are intact and symmetric; no gross motor/sensory deficits   SKIN: No rashes; no palpable lesions    LABS:                        14.9   6.38  )-----------( 180      ( 21 Apr 2020 06:30 )             42.8     04-21    135  |  99  |  25<H>  ----------------------------<  104<H>  3.8   |  20<L>  |  0.83    Ca    9.4      21 Apr 2020 06:30  Phos  2.6     04-21  Mg     2.1     04-20    TPro  7.7  /  Alb  3.2<L>  /  TBili  0.7  /  DBili  x   /  AST  75<H>  /  ALT  44<H>  /  AlkPhos  47  04-21    Culture - Stool (collected 20 Apr 2020 12:11)  Source: .Stool Feces  Preliminary Report (21 Apr 2020 08:00):    No enteric pathogens to date: Final culture pending    GI PCR Panel, Stool (collected 20 Apr 2020 04:08)  Source: .Stool Feces  Final Report (20 Apr 2020 14:06):    GI PCR Results: NOT detected    *******Please Note:*******    GI panel PCR evaluates for:    Campylobacter, Plesiomonas shigelloides, Salmonella,    Vibrio, Yersinia enterocolitica, Enteroaggregative    Escherichia coli (EAEC), Enteropathogenic E.coli (EPEC),    Enterotoxigenic E. coli (ETEC) lt/st, Shiga-like    toxin-producing E. coli (STEC) stx1/stx2,    Shigella/ Enteroinvasive E. coli (EIEC), Cryptosporidium,    Cyclospora cayetanensis, Entamoeba histolytica,    Giardia lamblia, Adenovirus F 40/41, Astrovirus,    Norovirus GI/GII, Rotavirus A, Sapovirus    Culture - Blood (collected 18 Apr 2020 20:53)  Source: .Blood Blood-Peripheral  Preliminary Report (19 Apr 2020 21:01):    No growth to date.    Culture - Urine (collected 18 Apr 2020 19:50)  Source: .Urine Clean Catch (Midstream)  Final Report (19 Apr 2020 22:11):    <10,000 CFU/mL Normal Urogenital Justine    Clostridium difficile Toxin by PCR (04.20.20 @ 06:08)    Clostridium difficile Toxin by PCR: Not Detected     This test determines the presence of the C. difficile tcdB  gene at a given time and is not  intended to identify  antibiotic associated disease or C. difficile infection  without  clinical context Successful treatment is based on  the resolution of clinical symptoms. This test should not be  used as a "test of cure" because C. difficile DNA will  persist after successful treatment. Repeat testing will not  be permitted.    This test is performed on the BD MAX system using Real-Time  PCR and fluorogenic target-specific hybridization.    Ferritin, Serum in AM (04.19.20 @ 05:40)    Ferritin, Serum: 2496 ng/mL    C-Reactive Protein, Serum (04.20.20 @ 11:20)    C-Reactive Protein, Serum: 170.9 mg/L    D-Dimer Assay, Quantitative (04.20.20 @ 11:20)    D-Dimer Assay, Quantitative: 570: A result less than 230 ng/mL DDU correlates with the absence  of thrombosis in a patient with low and moderate pre-test  probability of thrombosis.    Note: 1 ng/ml DDU   2 ng/ml FEU  If you have any questions, please contact Hematology Lab at  ext. [phone]. ng/mL      RADIOLOGY & ADDITIONAL TESTS:  Imaging from Last 24 Hours:  < from: Xray Chest 1 View- PORTABLE-Urgent (04.18.20 @ 15:03) >  IMPRESSION:   Bilateral patchy opacities -consistent with multifocal pneumonia - consistent with such entities as Covid-19.    < end of copied text >    COORDINATION OF CARE:  Care Discussed with Consultants/Other Providers: SARAH Grajeda

## 2020-04-21 NOTE — CONSULT NOTE ADULT - ASSESSMENT
Plan: 63 y/o Covid positive, MICU consulted for tachypnea and hypoxia.   Patient saturating well, mild tachypnea, but saturating >93% on NRB. D/w hospitalist no indication for MICU care at this time or advanced airway. Recommend continuing current care, proning if needed, and oxygen therapy. Patient A+Ox3, not altered at this time. No indication for MICU Care.  Recommend reconsulting if any worsening conditions.

## 2020-04-21 NOTE — CONSULT NOTE ADULT - ATTENDING COMMENTS
Acute hypoxic respiratory failure due to covid.  O2 sat 94% on NRB supine.  Patient talking in full sentences and says he feels better, though he waxes/wanes and intermittently appears dyspneic.  Given elevated CRP, agree with toci.  Prone as tolerated.  Trend lactate as that may be a sign of inadequate DO2.  Trend ddimer.

## 2020-04-22 NOTE — PROGRESS NOTE ADULT - SUBJECTIVE AND OBJECTIVE BOX
Medicine Progress Note  Maryuri Galvan DO  Pager # [phone]     Patient is a 64y old  Male who presents with a chief complaint of fever (2020 15:27)    SUBJECTIVE / OVERNIGHT EVENTS: Pt remains on 100% NRB, remains lucid, able to state name/, where he is.  Pt asking when he can go home and doesn't seem to understand how severe his COVID is.  Pt denies CP, abd pain, diarrhea has improved.     MEDICATIONS  (STANDING):  dextrose 5%. 1000 milliLiter(s) (50 mL/Hr) IV Continuous <Continuous>  dextrose 50% Injectable 12.5 Gram(s) IV Push once  dextrose 50% Injectable 25 Gram(s) IV Push once  dextrose 50% Injectable 25 Gram(s) IV Push once  enoxaparin Injectable 40 milliGRAM(s) SubCutaneous daily  insulin lispro (HumaLOG) corrective regimen sliding scale   SubCutaneous three times a day before meals    MEDICATIONS  (PRN):  acetaminophen   Tablet .. 650 milliGRAM(s) Oral every 6 hours PRN Temp greater or equal to 38C (100.4F)  ALBUTerol    90 MICROgram(s) HFA Inhaler 2 Puff(s) Inhalation every 4 hours PRN Shortness of Breath and/or Wheezing  dextrose 40% Gel 15 Gram(s) Oral once PRN Blood Glucose LESS THAN 70 milliGRAM(s)/deciliter  glucagon  Injectable 1 milliGRAM(s) IntraMuscular once PRN Glucose LESS THAN 70 milligrams/deciliter    CAPILLARY BLOOD GLUCOSE      POCT Blood Glucose.: 103 mg/dL (2020 11:45)  POCT Blood Glucose.: 102 mg/dL (2020 08:40)  POCT Blood Glucose.: 113 mg/dL (2020 21:50)  POCT Blood Glucose.: 130 mg/dL (2020 17:04)    I&O's Summary      PHYSICAL EXAM:  Vital Signs Last 24 Hrs  T(C): 36.9 (2020 10:33), Max: 36.9 (2020 10:33)  T(F): 98.5 (2020 10:33), Max: 98.5 (2020 10:33)  HR: 84 (2020 10:33) (75 - 84)  BP: 129/76 (2020 10:33) (118/77 - 129/76)  BP(mean): --  RR: 20 (2020 11:08) (20 - 20)  SpO2: 94% (2020 11:08) (92% - 96%)    CONSTITUTIONAL: Fatigued, in and out sleep   RESPIRATORY: Moderate respiratory distress, using accessory muscles, able to speak in full sentences   CARDIOVASCULAR:  No lower extremity edema  ABDOMEN: Nontender to palpation, no rebound/guarding  PSYCH: A+O to person, place, and time; intermittently drowsy  NEUROLOGY: CN 2-12 are intact and symmetric; no gross motor/sensory deficits   SKIN: No rashes; no palpable lesions    LABS:                        15.0   3.36  )-----------( 181      ( 2020 06:00 )             41.9     04-22    135  |  95<L>  |  35<H>  ----------------------------<  100<H>  3.6   |  25  |  1.01    Ca    9.5      2020 06:00  Phos  3.6     04-22  Mg     2.5     04-22    TPro  7.7  /  Alb  3.2<L>  /  TBili  0.7  /  DBili  x   /  AST  75<H>  /  ALT  44<H>  /  AlkPhos  47  04-21    GI PCR Panel, Stool (collected 2020 04:08)  Source: .Stool Feces  Final Report (2020 14:06):    GI PCR Results: NOT detected    *******Please Note:*******    GI panel PCR evaluates for:    Campylobacter, Plesiomonas shigelloides, Salmonella,    Vibrio, Yersinia enterocolitica, Enteroaggregative    Escherichia coli (EAEC), Enteropathogenic E.coli (EPEC),    Enterotoxigenic E. coli (ETEC) lt/st, Shiga-like    toxin-producing E. coli (STEC) stx1/stx2,    Shigella/ Enteroinvasive E. coli (EIEC), Cryptosporidium,    Cyclospora cayetanensis, Entamoeba histolytica,    Giardia lamblia, Adenovirus F 40/41, Astrovirus,    Norovirus GI/GII, Rotavirus A, Sapovirus    Culture - Stool (collected 2020 04:08)  Source: .Stool Feces  Final Report (2020 09:28):    No enteric gram negative rods isolated    No enteric pathogens isolated.    (Stool culture examined for Salmonella,    Shigella, Campylobacter, Aeromonas, Plesiomonas,    Vibrio, E.coli O157 and Yersinia)    Clostridium difficile Toxin by PCR (20 @ 06:08)    Clostridium difficile Toxin by PCR: Not Detected     This test determines the presence of the C. difficile tcdB  gene at a given time and is not  intended to identify  antibiotic associated disease or C. difficile infection  without  clinical context Successful treatment is based on  the resolution of clinical symptoms. This test should not be  used as a "test of cure" because C. difficile DNA will  persist after successful treatment. Repeat testing will not  be permitted.    This test is performed on the BD MAX system using Real-Time  PCR and fluorogenic target-specific hybridization.    Ferritin, Serum in AM (20 @ 05:40)    Ferritin, Serum: 2496 ng/mL    C-Reactive Protein, Serum (20 @ 11:20)    C-Reactive Protein, Serum: 170.9 mg/L    D-Dimer Assay, Quantitative (20 @ 06:00)    D-Dimer Assay, Quantitative: 927: A result less than 230 ng/mL DDU correlates with the absence  of thrombosis in a patient with low and moderate pre-test  probability of thrombosis.    Note: 1 ng/ml DDU   2 ng/ml FEU  If you have any questions, please contact Hematology Lab at  ext. [phone]. ng/mL    Blood Gas Arterial Comprehensive (20 @ 11:14)    pH, Arterial: 7.44 pH    pCO2, Arterial: 29 mmHg    pO2, Arterial: 71 mmHg    HCO3, Arterial: 22 mmol/L    Base Excess, Arterial: -3.8: BASE EXCESS: REFERENCE RANGE = 0 (+/-) 2 mmol/l mmol/L    Oxygen Saturation, Arterial: 94.4 %    Blood Gas Arterial - Sodium: 132 mmol/L    Blood Gas Arterial - Potassium: 3.3 mmol/L    Blood Gas Arterial - Glucose: 120 mg/dL    Blood Gas Arterial - Chloride: 103 mmol/L    Blood Gas Arterial - Lactate: 2.3: Please note updated reference range. mmol/L    Blood Gas Arterial - Hemoglobin: 15.0 g/dL    Blood Gas Arterial - Hematocrit: 46.1 %        RADIOLOGY & ADDITIONAL TESTS:  Imaging from Last 24 Hours:  < from: Xray Chest 1 View- PORTABLE-Urgent (20 @ 15:03) >  IMPRESSION:   Bilateral patchy opacities -consistent with multifocal pneumonia - consistent with such entities as Covid-19.      < end of copied text >    Electrocardiogram/QTc Interval:    COORDINATION OF CARE:  Care Discussed with Consultants/Other Providers: ICU Dr. Miles

## 2020-04-22 NOTE — RAPID RESPONSE TEAM SUMMARY - NSSITUATIONBACKGROUNDRRT_GEN_ALL_CORE
64M from home with PMHx of B-Cell Lymphoma formerly on Rituxan follows at [facility], HTN, DM2 presents with fever, chills, and cough x1 week admitted with sepsis and hypoxic resp failure 2/2 to COVID 19 infection. RRT called for worsening hypoxia to low 80s. Patient was noted to be febrile to 101F but otherwise tachycardic to 110s and SBP in 105 RR 25. Patient otherwise speaking in full sentences and understanding of situation. Also NRB noted to have button missing.     Patient given chest PT and proned to lateral decubitus position. Patient was given cooling measures and IV access replaced and OFirmev 1 g given. Patient saturation improved to 92%

## 2020-04-22 NOTE — CHART NOTE - NSCHARTNOTEFT_GEN_A_CORE
Rapid response called for hypoxia.  Patient on 15L NRB with oxygen level in the 80s.  Patient febrile 101.9.  Refer to rapid response note.  Patient currently in the 90s on NRB 15L.  Wife informed of above.  Discussed with Dr. Galvan, Lovenox dose increased to therapeutic dose.  Continue to monitor closely.

## 2020-04-22 NOTE — PROGRESS NOTE ADULT - PROBLEM SELECTOR PLAN 1
- CXR with ground glass opacities in left lobe.  Pt remains on 100% NRB, immediately desaturates on NC   - COVID PCR + at [facility] (Pt has two MRNs)  - Completed Plaquenil course  - Case d/w ID on 4/20 as pt with worsening respiratory status, rising inflammatory markers. Prior hospitalist discussed with pt's [facility] hematologist who stated lymphoma is indolent and it is OK to initiate Tocilizumab given his worsening status.   - Tocilizumab 400mg IV x1 dosed 4/21  - Bcx/UA/UCx are negative.  Cdiff negative.  Consider checking repeat Bcx 4/24 (72 hours post Toci dose)  - Continue to trend CRP/Ferritin/D-dimer q48 hours (next due 4/23)  - ABG checked: borderline respiratory alkalosis; ICU consulted 4/21, pt not candidate for ICU level of care at this time

## 2020-04-22 NOTE — PROGRESS NOTE ADULT - PROBLEM SELECTOR PLAN 2
- Follows with [facility]  - Will hold outpt chemotherapy given acute infection  - Prior hospitalist discussed care with Dr. Ashford  from [place] at [phone]

## 2020-04-23 NOTE — CHART NOTE - NSCHARTNOTEFT_GEN_A_CORE
65 YO Male   COVID +   95% on NRB  Notified by nurse patient removed his NRB and climbed our the bed railing  Patient was found on the floor, laying on his left side  Not in acute distress  Patient states he hit his head   Patient does not have any pain or any other complaints     Head CT Ordered     Discussed with Dr. Alvarado

## 2020-04-23 NOTE — DIETITIAN INITIAL EVALUATION ADULT. - PERTINENT LABORATORY DATA
04-23 Na134 mmol/L<L> Glu 120 mg/dL<H> K+ 3.6 mmol/L Cr  0.94 mg/dL BUN 38 mg/dL<H> 04-23 Phos 3.9 mg/dL 04-21 Alb 3.2 g/dL<L>

## 2020-04-23 NOTE — DIETITIAN INITIAL EVALUATION ADULT. - PROBLEM SELECTOR PLAN 1
-Pt febrile, tachypneic with WBC <4 on presentation to [facility]  -CXR with ground glass opacities in left lobe. Pt initially on a NRB but de-escalated to NC  -COVID PCR + at [facility] (Pt has a two MRNs)  -suspect sepsis likely related to viral PNA. Lower suspicion for bacterial superinfection given improvement off antibiotic  -low risk neutropenia, will monitor off antibiotics for now  -with reported 13 day history of diarrhea which may be 2.2 to covid however will check c diff, GI PCR and stool studies  -trend absolute neutrophil count. If drops below 500, will start empirically on cefepime   -start on plaquenil

## 2020-04-23 NOTE — DIETITIAN INITIAL EVALUATION ADULT. - OTHER INFO
64M from home, ambulates independently with PMHx of B-Cell Lymphoma formerly on Rituxan follows at [facility], HTN, DM2 HbA1c 6.3% presents with fever, chills, and cough x1 week admitted with sepsis and hypoxic resp failure 2/2 to COVID 19 infection (tested positive at [facility]). Unable to conduct a face to face interview or nutrition-focused physical exam due to limited contact restrictions related to Pt's medical condition and isolation precautions. Collateral obtained from chart review. Unable to assess PO intake. Patient with Diarrhea, now resolved- No GI distress (nausea/vomiting/diarrhea/constipation) per chart. Last BM (4/22/20). No chewing or swallowing difficulties at this time per chart. Unable to assess wt history. Current weight (4/14/20) 100kg. BMI 29.1 (Overweight).

## 2020-04-23 NOTE — PROGRESS NOTE ADULT - PROBLEM SELECTOR PLAN 7
- Mild Hyponatremia, s/p 500 ml of IVF at [facility]  - on admission Na 129, now 134 -> 130 -> 132-->131 --> 132 --> 135  - Urine studies reviewed, suggestive of prerenal/hypovolemic state, will hold IVF for now as Na improved

## 2020-04-23 NOTE — PROGRESS NOTE ADULT - SUBJECTIVE AND OBJECTIVE BOX
[facility] Division of Hospital Medicine  Constantine Alvarado MD  Pager (M-F, 8A-5P): [phone]  Other Times:  [phone]    Patient is a 64y old  Male who presents with a chief complaint of fever (22 Apr 2020 13:03)    SUBJECTIVE / OVERNIGHT EVENTS:  Patient still disoriented/delirious.  Unable to provide meaning for history.  Denies any discomforts this morning.  RRT episode from last night noted.  No N/V, CP,  lightheadedness, dizziness, abdominal pain, diarrhea, dysuria.    MEDICATIONS  (STANDING):  dextrose 5%. 1000 milliLiter(s) (50 mL/Hr) IV Continuous <Continuous>  dextrose 50% Injectable 12.5 Gram(s) IV Push once  dextrose 50% Injectable 25 Gram(s) IV Push once  dextrose 50% Injectable 25 Gram(s) IV Push once  enoxaparin Injectable 100 milliGRAM(s) SubCutaneous two times a day  insulin lispro (HumaLOG) corrective regimen sliding scale   SubCutaneous three times a day before meals    MEDICATIONS  (PRN):  acetaminophen   Tablet .. 650 milliGRAM(s) Oral every 6 hours PRN Temp greater or equal to 38C (100.4F)  ALBUTerol    90 MICROgram(s) HFA Inhaler 2 Puff(s) Inhalation every 4 hours PRN Shortness of Breath and/or Wheezing  dextrose 40% Gel 15 Gram(s) Oral once PRN Blood Glucose LESS THAN 70 milliGRAM(s)/deciliter  glucagon  Injectable 1 milliGRAM(s) IntraMuscular once PRN Glucose LESS THAN 70 milligrams/deciliter  guaiFENesin   Syrup  (Sugar-Free) 100 milliGRAM(s) Oral every 6 hours PRN Cough      Vital Signs Last 24 Hrs  T(C): 36.6 (23 Apr 2020 01:05), Max: 38.7 (22 Apr 2020 19:30)  T(F): 97.9 (23 Apr 2020 01:05), Max: 101.7 (22 Apr 2020 19:30)  HR: 89 (22 Apr 2020 22:26) (89 - 123)  BP: 110/72 (22 Apr 2020 22:26) (110/72 - 133/115)  BP(mean): --  RR: 24 (23 Apr 2020 01:05) (22 - 34)  SpO2: 97% (23 Apr 2020 01:05) (83% - 97%)  CAPILLARY BLOOD GLUCOSE      POCT Blood Glucose.: 107 mg/dL (23 Apr 2020 11:46)  POCT Blood Glucose.: 131 mg/dL (23 Apr 2020 07:32)  POCT Blood Glucose.: 104 mg/dL (22 Apr 2020 20:59)  POCT Blood Glucose.: 96 mg/dL (22 Apr 2020 16:26)    I&O's Summary    22 Apr 2020 07:01  -  23 Apr 2020 07:00  --------------------------------------------------------  IN: 0 mL / OUT: 150 mL / NET: -150 mL        PHYSICAL EXAM:  GENERAL: NAD  HEAD:  Atraumatic, Normocephalic  EYES: EOMI, PERRLA, conjunctiva and sclera clear  NECK: Supple, No JVD  CHEST/LUNG: No respiratory distress  ABDOMEN: Nondistended; Bowel sounds present  EXTREMITIES:  No clubbing, cyanosis, or edema  PSYCH: Calm  NEUROLOGY: A/Ox1 - person only, non-focal motor deficits  SKIN: No rashes or lesions    LABS:                        15.5   4.65  )-----------( 178      ( 23 Apr 2020 08:55 )             44.1     04-23    134<L>  |  93<L>  |  38<H>  ----------------------------<  120<H>  3.6   |  24  |  0.94    Ca    9.1      23 Apr 2020 08:55  Phos  3.9     04-23  Mg     2.2     04-23                Procalcitonin, Serum: 0.40 ng/mL (04-20-20 @ 11:20)    C-Reactive Protein, Serum: 170.9 mg/L (04-20-20 @ 11:20)      D-Dimer Assay, Quantitative: 927 ng/mL (04-22-20 @ 06:00)  D-Dimer Assay, Quantitative: 570 ng/mL (04-20-20 @ 11:20)  D-Dimer Assay, Quantitative: 282 ng/mL (04-18-20 @ 04:40)  D-Dimer Assay, Quantitative: 252 ng/mL (04-17-20 @ 07:17)    Ferritin, Serum: 2496 ng/mL (04-19-20 @ 05:40)  Ferritin, Serum: 2335 ng/mL (04-18-20 @ 04:40)  Ferritin, Serum: 2451 ng/mL (04-17-20 @ 07:17)        RADIOLOGY & ADDITIONAL TESTS:    Imaging Personally Reviewed:    Care Discussed with Consultants/Other Providers:

## 2020-04-23 NOTE — DIETITIAN INITIAL EVALUATION ADULT. - ADD RECOMMEND
1. Continue with current diet order which remains appropriate at this time. 2. Please Encourage po intake, assist with meals and menu selections, provide alternatives PRN. 3. Monitor weights, labs, BM's, skin integrity, p.o. intake. 4. RD remains available, re-consult as needed.

## 2020-04-23 NOTE — PROVIDER CONTACT NOTE (FALL NOTIFICATION) - SITUATION
Fall Occurrence, from bed to floor, climbed out of bed unassisted, pt found on the floor, responsive, vitals stable

## 2020-04-23 NOTE — PROGRESS NOTE ADULT - PROBLEM SELECTOR PLAN 2
PNA with hypoxia.  - CXR with ground glass opacities in left lobe.  Pt remains on 100% NRB, immediately desaturates on NC   - COVID PCR + at [facility] (Pt has two MRNs)  - Completed Plaquenil course  - Case d/w ID on 4/20 as pt with worsening respiratory status, rising inflammatory markers. Prior hospitalist discussed with pt's [facility] hematologist who stated lymphoma is indolent and it is OK to initiate Tocilizumab given his worsening status.   - Tocilizumab 400mg IV x1 dosed 4/21  - Bcx/UA/UCx are negative.  Cdiff negative.  Consider checking repeat Bcx 4/24 (72 hours post Toci dose)  - Continue to trend CRP/Ferritin/D-dimer q48 hours (next due 4/23)  - ABG checked: borderline respiratory alkalosis; ICU consulted 4/21, pt not candidate for ICU level of care at this time

## 2020-04-23 NOTE — RAPID RESPONSE TEAM SUMMARY - NSOTHERINTERVENTIONSRRT_GEN_ALL_CORE
- Rotate positions as able  - consider zyprexa/seroquel for delirium  - follow up GOC with family.
- Chest PT  - Robitussin to decrease distressing cough   - Official EKG after tylenol completed to assess tachycardia and rhythm   - Advised primary team given patient underlying malignancy as well as current COVID infection and ddimer elevation to consider discussion regarding starting empiric full dose AC vs CTA if stable during the day

## 2020-04-23 NOTE — RAPID RESPONSE TEAM SUMMARY - NSSITUATIONBACKGROUNDRRT_GEN_ALL_CORE
64M from home with PMHx of B-Cell Lymphoma formerly on Rituxan follows at [facility], HTN, DM2 presents with fever, chills, and cough x1 week admitted with sepsis and hypoxic resp failure 2/2 to COVID 19 infection. RRT called for today as patient desaturated to 70s after shifting lateral decub position to supine. Of note, patient also had a in hospital fall event where he was trying to climb out of bed. During RRT patient not cooperating to lie on side and requesting water. HOB elevated while supine with improvement of Spo2 to 88%, remaining VS temp 100.2 HR 110s. Patient also with possible hospital delirium.     Discussed with patient wife about intubation status- understand significant increase in mortality once intubated. She will discuss further with family.

## 2020-04-23 NOTE — DIETITIAN INITIAL EVALUATION ADULT. - PROBLEM SELECTOR PLAN 6
-hyponatremia likely 2/2 poor po intake, Mild Hyponatremia  s/p 500 ml of IVF at [facility]  - on exam: AAOx3 on admission , Asymptomatic   - on admission Na 129  - Monitor serum sodium for now and aim to correct serum sodium not more than 6-8 meq/L in the first 24hrs

## 2020-04-23 NOTE — PROGRESS NOTE ADULT - PROBLEM SELECTOR PLAN 3
- Follows with [facility]  - Will hold outpt chemotherapy given acute infection  - discussed care with Dr. Ashford  from [facility] at [phone]  - will check Ig level in AM to assess for immunosuppression level.

## 2020-04-23 NOTE — DIETITIAN INITIAL EVALUATION ADULT. - PERTINENT MEDS FT
MEDICATIONS  (STANDING):  dextrose 5%. 1000 milliLiter(s) (50 mL/Hr) IV Continuous <Continuous>  dextrose 50% Injectable 12.5 Gram(s) IV Push once  dextrose 50% Injectable 25 Gram(s) IV Push once  dextrose 50% Injectable 25 Gram(s) IV Push once  enoxaparin Injectable 100 milliGRAM(s) SubCutaneous two times a day  insulin lispro (HumaLOG) corrective regimen sliding scale   SubCutaneous three times a day before meals    MEDICATIONS  (PRN):  acetaminophen   Tablet .. 650 milliGRAM(s) Oral every 6 hours PRN Temp greater or equal to 38C (100.4F)  ALBUTerol    90 MICROgram(s) HFA Inhaler 2 Puff(s) Inhalation every 4 hours PRN Shortness of Breath and/or Wheezing  dextrose 40% Gel 15 Gram(s) Oral once PRN Blood Glucose LESS THAN 70 milliGRAM(s)/deciliter  glucagon  Injectable 1 milliGRAM(s) IntraMuscular once PRN Glucose LESS THAN 70 milligrams/deciliter  guaiFENesin   Syrup  (Sugar-Free) 100 milliGRAM(s) Oral every 6 hours PRN Cough

## 2020-04-24 NOTE — PROGRESS NOTE ADULT - PROBLEM SELECTOR PLAN 2
PNA with hypoxia.  - CXR with ground glass opacities in left lobe.  Pt remains on 100% NRB, immediately desaturates on NC   - [facility]  - Completed Plaquenil course  - Case d/w ID on 4/20 as pt with worsening respiratory status, rising inflammatory markers. Prior hospitalist discussed with pt's [facility] hematologist who stated lymphoma is indolent and it is OK to initiate Tocilizumab given his worsening status.   - Tocilizumab 400mg IV x1 dosed 4/21  - Bcx/UA/UCx are negative.  Cdiff negative.  Consider checking repeat Bcx 4/24 (72 hours post Toci dose)  - Continue to trend CRP/Ferritin/D-dimer q48 hours (next due 4/23)  - ABG checked: borderline respiratory alkalosis; ICU consulted 4/21, pt not candidate for ICU level of care at this time

## 2020-04-24 NOTE — PROGRESS NOTE ADULT - PROBLEM SELECTOR PLAN 7
- Mild Hyponatremia, s/p 500 ml of IVF at [facility]  - on admission Na 129, now 134 -> 130 -> 132-->131 --> 132 --> 135  - Urine studies reviewed, suggestive of prerenal/hypovolemic state, will hold IVF for now as Na improved

## 2020-04-24 NOTE — PROGRESS NOTE ADULT - SUBJECTIVE AND OBJECTIVE BOX
[facility] Division of Hospital Medicine  Constantine Alvarado MD  Pager (M-F, 8A-5P): [phone]  Other Times:  [phone]    Patient is a 64y old  Male who presents with a chief complaint of fever (23 Apr 2020 13:06)    SUBJECTIVE / OVERNIGHT EVENTS:  Events from previous 24 hours noted.  This morning, patient offers no new complaints.  Saturating in the 89-91% range on NRB.  Aware that he's in J. Still with dyspnea  No N/V, CP,  lightheadedness, dizziness, abdominal pain, diarrhea, dysuria.    MEDICATIONS  (STANDING):  dextrose 5%. 1000 milliLiter(s) (50 mL/Hr) IV Continuous <Continuous>  dextrose 50% Injectable 12.5 Gram(s) IV Push once  dextrose 50% Injectable 25 Gram(s) IV Push once  dextrose 50% Injectable 25 Gram(s) IV Push once  enoxaparin Injectable 100 milliGRAM(s) SubCutaneous two times a day  insulin lispro (HumaLOG) corrective regimen sliding scale   SubCutaneous three times a day before meals    MEDICATIONS  (PRN):  acetaminophen   Tablet .. 650 milliGRAM(s) Oral every 6 hours PRN Temp greater or equal to 38C (100.4F)  ALBUTerol    90 MICROgram(s) HFA Inhaler 2 Puff(s) Inhalation every 4 hours PRN Shortness of Breath and/or Wheezing  dextrose 40% Gel 15 Gram(s) Oral once PRN Blood Glucose LESS THAN 70 milliGRAM(s)/deciliter  glucagon  Injectable 1 milliGRAM(s) IntraMuscular once PRN Glucose LESS THAN 70 milligrams/deciliter  guaiFENesin   Syrup  (Sugar-Free) 100 milliGRAM(s) Oral every 6 hours PRN Cough      Vital Signs Last 24 Hrs  T(C): 36.7 (24 Apr 2020 06:36), Max: 38.1 (23 Apr 2020 17:32)  T(F): 98.1 (24 Apr 2020 06:36), Max: 100.5 (23 Apr 2020 17:32)  HR: 100 (24 Apr 2020 06:36) (94 - 115)  BP: 128/86 (24 Apr 2020 06:36) (123/75 - 164/90)  BP(mean): --  RR: 30 (24 Apr 2020 06:36) (20 - 30)  SpO2: 92% (24 Apr 2020 06:36) (87% - 96%)  CAPILLARY BLOOD GLUCOSE      POCT Blood Glucose.: 137 mg/dL (24 Apr 2020 12:00)  POCT Blood Glucose.: 129 mg/dL (24 Apr 2020 08:33)  POCT Blood Glucose.: 133 mg/dL (23 Apr 2020 16:57)    I&O's Summary      PHYSICAL EXAM:  GENERAL: NAD  HEAD:  Atraumatic, Normocephalic  EYES: EOMI, PERRLA, conjunctiva and sclera clear  NECK: Supple, No JVD  CHEST/LUNG: Mild respiratory distress  ABDOMEN: Nondistended; Bowel sounds present  EXTREMITIES:  No clubbing, cyanosis, or edema  PSYCH: Calm  NEUROLOGY: A/Ox2 - person and place, non-focal motor deficits  SKIN: No rashes or lesions    LABS:                        15.6   7.22  )-----------( 159      ( 24 Apr 2020 06:01 )             43.5     04-24    133<L>  |  94<L>  |  35<H>  ----------------------------<  131<H>  3.7   |  23  |  0.71    Ca    8.9      24 Apr 2020 08:30  Phos  3.6     04-24  Mg     2.2     04-24    TPro  6.7  /  Alb  3.0<L>  /  TBili  0.5  /  DBili  x   /  AST  85<H>  /  ALT  51<H>  /  AlkPhos  103  04-24              Procalcitonin, Serum: 0.40 ng/mL (04-20-20 @ 11:20)    C-Reactive Protein, Serum: 170.9 mg/L (04-20-20 @ 11:20)      D-Dimer Assay, Quantitative: 927 ng/mL (04-22-20 @ 06:00)  D-Dimer Assay, Quantitative: 570 ng/mL (04-20-20 @ 11:20)  D-Dimer Assay, Quantitative: 282 ng/mL (04-18-20 @ 04:40)    Ferritin, Serum: 2496 ng/mL (04-19-20 @ 05:40)  Ferritin, Serum: 2335 ng/mL (04-18-20 @ 04:40)        RADIOLOGY & ADDITIONAL TESTS:    Imaging Personally Reviewed:    Care Discussed with Consultants/Other Providers:

## 2020-04-24 NOTE — PROGRESS NOTE ADULT - PROBLEM SELECTOR PLAN 10
Discussed with HCP Nnamdi Thornton [phone] for 25 minutes about advanced care directives.  Agree that patient would not wanted to be intubated/ventilated and resuscitated.  DNR/DNI. MOLST form filled out.  Will place in chart.

## 2020-04-24 NOTE — PROGRESS NOTE ADULT - PROBLEM SELECTOR PLAN 3
- Follows with [facility]  - Will hold outpt chemotherapy given acute infection  - discussed care with Dr. Ashford  from [facility] at [phone]  - will check Ig level in AM to assess for immunosuppression level.

## 2020-04-25 NOTE — CHART NOTE - NSCHARTNOTEFT_GEN_A_CORE
Patient agitated, attempting to get out of bed and desaturating secondary to pulling off NRB mask. Unable to be redirected. Haldol 2mg PO x1 ordered.  on 4/22/2020. Will need repeat EKG if requiring further antipsychotics.

## 2020-04-25 NOTE — PROGRESS NOTE ADULT - PROBLEM SELECTOR PLAN 7
- Mild Hyponatremia, s/p 500 ml of IVF at [facility]  - on admission Na 129, now 134 -> 130 -> 132-->131 --> 132 --> 135  - Urine studies reviewed, suggestive of prerenal/hypovolemic state, will hold IVF for now as Na improved

## 2020-04-25 NOTE — PROGRESS NOTE ADULT - ATTENDING COMMENTS
Patient was seen and examined by me on 04/15/2020,interim events noted,labs and radiology studies reviewed.  Rad Triana MD,FACC.  [address] [place] [address].  [facility].  [phone]
4/17/20 - I updated wife via telephone, all questions answered
Updated wife via telephone, all questions answered
case d/w wife, explained that pt says  he wants to get out . Wife requesting for patient to stay in the hospital as she and her baby are sick too at home , all questions answered   case d/w Hematologist,  Dr Ashford at [phone],  , informed by the Dr Faria that patient's Lymphoma is very indolent and we can use Tocilizumab if needed  will consider adding it blood and urine cx negative and unable to wean pt off Oxygen
Case discussed with wife Nnamdi Thornton [phone] on 4/23.  Answered all the questions. Discussed with Dr. Ashford [phone] on 4/23.
Message left  for wife on the phone
Case discussed with wife Nnamdi Thornton [phone] on 4/23, 4/24.  Answered all the questions. Discussed with Dr. Ashford [phone] on 4/23.
Case discussed with wife Nnamdi Thornton [phone] on 4/23, 4/24.  Answered all the questions. Left v-mail on 4/25. Discussed with Dr. Ashford [phone] on 4/23.
plan of care was d/w wife on the phone

## 2020-04-25 NOTE — PROGRESS NOTE ADULT - PROBLEM SELECTOR PLAN 3
- Follows with [facility]  - Will hold outpt chemotherapy given acute infection  - discussed care with Dr. Ashford  from [facility] at [phone]  - will check Ig level in AM to assess for immunosuppression level.

## 2020-04-25 NOTE — PROGRESS NOTE ADULT - PROBLEM SELECTOR PROBLEM 8
Diabetes mellitus
Need for prophylactic measure

## 2020-04-25 NOTE — PROGRESS NOTE ADULT - PROBLEM SELECTOR PLAN 10
Discussed with HCP Nnamdi Thornton [phone] for 25 minutes about advanced care directives on 4/24.  Agree that patient would not wanted to be intubated/ventilated and resuscitated.  DNR/DNI. MOLST form in the chart.

## 2020-04-25 NOTE — PROGRESS NOTE ADULT - SUBJECTIVE AND OBJECTIVE BOX
[facility] Division of Hospital Medicine  Constantine Alvarado MD  Pager (M-F, 8A-5P): [phone]  Other Times:  [phone]    Patient is a 64y old  Male who presents with a chief complaint of fever (24 Apr 2020 12:04)    SUBJECTIVE / OVERNIGHT EVENTS:  Patient disoriented and agitated this AM - Haldol given with good response.  Offers no new complaints.  Wondering when he can go home.  Unable to understand his medical situation.  No N/V, CP,  lightheadedness, dizziness, abdominal pain, diarrhea, dysuria.    MEDICATIONS  (STANDING):  dextrose 5%. 1000 milliLiter(s) (50 mL/Hr) IV Continuous <Continuous>  dextrose 50% Injectable 12.5 Gram(s) IV Push once  dextrose 50% Injectable 25 Gram(s) IV Push once  dextrose 50% Injectable 25 Gram(s) IV Push once  enoxaparin Injectable 100 milliGRAM(s) SubCutaneous two times a day  insulin lispro (HumaLOG) corrective regimen sliding scale   SubCutaneous three times a day before meals    MEDICATIONS  (PRN):  acetaminophen   Tablet .. 650 milliGRAM(s) Oral every 6 hours PRN Temp greater or equal to 38C (100.4F)  ALBUTerol    90 MICROgram(s) HFA Inhaler 2 Puff(s) Inhalation every 4 hours PRN Shortness of Breath and/or Wheezing  dextrose 40% Gel 15 Gram(s) Oral once PRN Blood Glucose LESS THAN 70 milliGRAM(s)/deciliter  glucagon  Injectable 1 milliGRAM(s) IntraMuscular once PRN Glucose LESS THAN 70 milligrams/deciliter  guaiFENesin   Syrup  (Sugar-Free) 100 milliGRAM(s) Oral every 6 hours PRN Cough      Vital Signs Last 24 Hrs  T(C): 36.6 (25 Apr 2020 05:49), Max: 36.6 (25 Apr 2020 05:49)  T(F): 97.9 (25 Apr 2020 05:49), Max: 97.9 (25 Apr 2020 05:49)  HR: 126 (25 Apr 2020 12:01) (105 - 126)  BP: 119/73 (25 Apr 2020 05:49) (119/73 - 146/80)  BP(mean): --  RR: 24 (25 Apr 2020 12:01) (22 - 24)  SpO2: 88% (25 Apr 2020 12:01) (88% - 88%)  CAPILLARY BLOOD GLUCOSE      POCT Blood Glucose.: 141 mg/dL (25 Apr 2020 13:09)  POCT Blood Glucose.: 154 mg/dL (25 Apr 2020 08:51)  POCT Blood Glucose.: 122 mg/dL (24 Apr 2020 21:14)  POCT Blood Glucose.: 126 mg/dL (24 Apr 2020 16:21)    I&O's Summary      PHYSICAL EXAM:  GENERAL: NAD  HEAD:  Atraumatic, Normocephalic  EYES: EOMI, PERRLA, conjunctiva and sclera clear  NECK: Supple, No JVD  CHEST/LUNG: Mild respiratory distress  ABDOMEN: Nondistended; Bowel sounds present  EXTREMITIES:  No clubbing, cyanosis, or edema  PSYCH: Calm  NEUROLOGY: A/Ox1 - person, non-focal motor deficits  SKIN: No rashes or lesions    LABS:                        15.7   8.09  )-----------( 157      ( 25 Apr 2020 06:30 )             44.0     04-25    136  |  92<L>  |  39<H>  ----------------------------<  171<H>  3.8   |  23  |  0.80    Ca    8.8      25 Apr 2020 06:30  Phos  3.6     04-24  Mg     2.2     04-25    TPro  6.8  /  Alb  3.3  /  TBili  0.7  /  DBili  x   /  AST  84<H>  /  ALT  52<H>  /  AlkPhos  116  04-25              Procalcitonin, Serum: 0.40 ng/mL (04-20-20 @ 11:20)    C-Reactive Protein, Serum: 170.9 mg/L (04-20-20 @ 11:20)      D-Dimer Assay, Quantitative: 927 ng/mL (04-22-20 @ 06:00)  D-Dimer Assay, Quantitative: 570 ng/mL (04-20-20 @ 11:20)    Ferritin, Serum: 2496 ng/mL (04-19-20 @ 05:40)        RADIOLOGY & ADDITIONAL TESTS:    Imaging Personally Reviewed:    Care Discussed with Consultants/Other Providers:

## 2020-04-25 NOTE — PROGRESS NOTE ADULT - PROBLEM SELECTOR PLAN 2
PNA with hypoxia.  - CXR with ground glass opacities in left lobe.  Pt remains on 100% NRB, immediately desaturates on NC   - COVID PCR + at [facility] (Pt has two MRNs)  - Completed Plaquenil course  - Case d/w ID on 4/20 as pt with worsening respiratory status, rising inflammatory markers. Prior hospitalist discussed with pt's [facility] hematologist who stated lymphoma is indolent and it is OK to initiate Tocilizumab given his worsening status.   - Tocilizumab 400mg IV x1 dosed 4/21  - Bcx/UA/UCx are negative.  Cdiff negative.  Consider checking repeat Bcx 4/24 (72 hours post Toci dose)  - Continue to trend CRP/Ferritin/D-dimer q48 hours (next due 4/23)  - ABG checked: borderline respiratory alkalosis; ICU consulted 4/21, pt not candidate for ICU level of care at this time  - DNR/DNI - MOLST in the chart.

## 2020-04-25 NOTE — CHART NOTE - NSCHARTNOTEFT_GEN_A_CORE
Pt agitated and removing O2 mask, desatting to 84.  DNR/DNI.  Previously got haldol, but some concern over repeating this given his QTc of 488.    Repeated an EKG, which was made somewhat difficult by pt agitation.  QTc was 492-488.  Called pharmacy for consult, and elected benadryl and ativan.  Pharmacy recommended up to 2 mg ativan, gave 1 to start and will order another if needed.

## 2020-04-26 NOTE — PROGRESS NOTE ADULT - PROBLEM SELECTOR PROBLEM 1
2019 novel coronavirus disease (COVID-19)
Encephalopathy acute
Sepsis
Sepsis
2019 novel coronavirus disease (COVID-19)

## 2020-04-26 NOTE — PROGRESS NOTE ADULT - SUBJECTIVE AND OBJECTIVE BOX
Medicine Progress Note  Maryuri Galvan DO  Pager # [phone]     Patient is a 64y old  Male who presents with a chief complaint of fever (25 Apr 2020 13:49)    SUBJECTIVE / OVERNIGHT EVENTS:  Pt was seen earlier at 9:30AM, at the time he was restless, tossing and turning around his bed, unable to communicate his needs.  Found to be persistently hypoxic for past 24 hours between 87-92% on 100% NRB.     MEDICATIONS  (STANDING):  dextrose 5%. 1000 milliLiter(s) (50 mL/Hr) IV Continuous <Continuous>  dextrose 50% Injectable 12.5 Gram(s) IV Push once  dextrose 50% Injectable 25 Gram(s) IV Push once  dextrose 50% Injectable 25 Gram(s) IV Push once  enoxaparin Injectable 100 milliGRAM(s) SubCutaneous two times a day  insulin lispro (HumaLOG) corrective regimen sliding scale   SubCutaneous three times a day before meals    MEDICATIONS  (PRN):  acetaminophen   Tablet .. 650 milliGRAM(s) Oral every 6 hours PRN Temp greater or equal to 38C (100.4F)  ALBUTerol    90 MICROgram(s) HFA Inhaler 2 Puff(s) Inhalation every 4 hours PRN Shortness of Breath and/or Wheezing  dextrose 40% Gel 15 Gram(s) Oral once PRN Blood Glucose LESS THAN 70 milliGRAM(s)/deciliter  glucagon  Injectable 1 milliGRAM(s) IntraMuscular once PRN Glucose LESS THAN 70 milligrams/deciliter  guaiFENesin   Syrup  (Sugar-Free) 100 milliGRAM(s) Oral every 6 hours PRN Cough  morphine  - Injectable 1 milliGRAM(s) IV Push every 4 hours PRN Dyspnea    CAPILLARY BLOOD GLUCOSE      POCT Blood Glucose.: 137 mg/dL (26 Apr 2020 08:14)  POCT Blood Glucose.: 167 mg/dL (25 Apr 2020 21:55)  POCT Blood Glucose.: 149 mg/dL (25 Apr 2020 16:12)  POCT Blood Glucose.: 141 mg/dL (25 Apr 2020 13:09)      PHYSICAL EXAM:  Vital Signs Last 24 Hrs  T(C): 36.1 (26 Apr 2020 04:28), Max: 37.8 (25 Apr 2020 19:39)  T(F): 96.9 (26 Apr 2020 04:28), Max: 100.1 (25 Apr 2020 19:39)  HR: 114 (25 Apr 2020 19:39) (114 - 126)  BP: 144/85 (25 Apr 2020 19:39) (144/85 - 144/85)  BP(mean): --  RR: 24 (25 Apr 2020 19:39) (24 - 24)  SpO2: 88% (25 Apr 2020 19:39) (88% - 88%)    CONSTITUTIONAL: Restless, agitated  RESPIRATORY: Severe respiratory distress, tachypneic   CARDIOVASCULAR: No lower extremity edema  ABDOMEN: Nontender to palpation, no rebound/guarding  NEUROLOGY: CN 2-12 are intact and symmetric; no gross motor/sensory deficits   SKIN: No rashes; no palpable lesions    LABS:                        15.7   8.09  )-----------( 157      ( 25 Apr 2020 06:30 )             44.0     04-25    136  |  92<L>  |  39<H>  ----------------------------<  171<H>  3.8   |  23  |  0.80    Ca    8.8      25 Apr 2020 06:30  Mg     2.2     04-25    TPro  6.8  /  Alb  3.3  /  TBili  0.7  /  DBili  x   /  AST  84<H>  /  ALT  52<H>  /  AlkPhos  116  04-25

## 2020-04-26 NOTE — PROGRESS NOTE ADULT - PROBLEM SELECTOR PLAN 2
Prior attending discussed with HCP Nnamdi Thornton [phone] about advanced care directives on 4/24.  Agree that patient would not wanted to be intubated/ventilated and resuscitated.  Pt was DNR/DNI. MOLST form in the chart.  Wife was updated on pt's expiration on 4/26.

## 2020-04-26 NOTE — DISCHARGE NOTE FOR THE EXPIRED PATIENT - HOSPITAL COURSE
64M from home, ambulates independently with PMHx of B-Cell Lymphoma formerly on Rituxan follows at [facility], HTN, DM2 presents with fever, chills, and cough x1 week admitted with sepsis and hypoxic resp failure 2/2 to COVID 19 PNA (tested positive at [facility]) with hypoxia  and acute encephalopathy. Patient unable to be weaned off NRB. Completed Plaquenil and received Tocilizumab x1 dose. DNR/DNI.  from cardiopulmonary arrest on 2020.

## 2020-04-26 NOTE — PROGRESS NOTE ADULT - PROBLEM SELECTOR PROBLEM 2
2019 novel coronavirus disease (COVID-19)
Advanced care planning/counseling discussion
Diffuse large B cell lymphoma
Respiratory failure with hypoxia
Respiratory failure with hypoxia
Diffuse large B cell lymphoma

## 2020-04-26 NOTE — CHART NOTE - NSCHARTNOTEFT_GEN_A_CORE
Informed by RN patient appears to have . Death pronounced at 10:35AM this morning. Dr. Galvan made family aware and wife was able to use ipad to see patient following expiration.

## 2020-04-26 NOTE — PROGRESS NOTE ADULT - ASSESSMENT
63 yo male from home, ambulates independently with PMHx of B-Cell Lymph[place] [facility] [place] [facility] [place], DM presents with fever, chills, and cough x1 week admitted with sepsis and hypoxic resp failure 2/2 to COVID 19 infection
63 yo male from home, ambulates independently with PMHx of B-Cell Lymphoma currently undergoing treatment at [facility] [place], HTN, DM presents with fever, chills, and cough x1 week admitted with sepsis and hypoxic resp failure 2/2 to COVID 19 infection
63 yo male from home, ambulates independently with PMHx of B-Cell Lymphoma currently undergoing treatment at [facility] [place], HTN, DM presents with fever, chills, and cough x1 week admitted with sepsis and hypoxic resp failure 2/2 to COVID 19 infection
64M from home, ambulates independently with PMHx of B-Cell Lymphoma formerly on Rituxan follows at [facility], HTN, DM2 presents with fever, chills, and cough x1 week admitted with sepsis and hypoxic resp failure 2/2 to COVID 19 PNA (tested positive at [facility]) with hypoxia  and acute encephalopathy.
64M from home, ambulates independently with PMHx of B-Cell Lymphoma formerly on Rituxan follows at [facility], HTN, DM2 presents with fever, chills, and cough x1 week admitted with sepsis and hypoxic resp failure 2/2 to COVID 19 PNA (tested positive at [facility]) with hypoxia  and acute encephalopathy.
64M from home, ambulates independently with PMHx of B-Cell Lymphoma formerly on Rituxan follows at [facility], HTN, DM2 presents with fever, chills, and cough x1 week admitted with sepsis and hypoxic resp failure 2/2 to COVID 19 PNA (tested positive at [facility]) with hypoxia  and acute encephalopathy.  At time of writing this note, I was notified of patient's expiration.
64M from home, ambulates independently with PMHx of B-Cell Lymphoma formerly on Rituxan follows at [facility], HTN, DM2 presents with fever, chills, and cough x1 week admitted with sepsis and hypoxic resp failure 2/2 to COVID 19 PNA (tested positive at [facility]) with hypoxia  and acute encephalopathy.
64M from home, ambulates independently with PMHx of B-Cell Lymphoma formerly on Rituxan follows at [facility], HTN, DM2 presents with fever, chills, and cough x1 week admitted with sepsis and hypoxic resp failure 2/2 to COVID 19 infection (tested positive at [facility])
64M from home, ambulates independently with PMHx of B-Cell Lymphoma formerly on Rituxan follows at [facility], HTN, DM2 presents with fever, chills, and cough x1 week admitted with sepsis and hypoxic resp failure 2/2 to COVID 19 infection (tested positive at [facility])
65 yo male from home, ambulates independently with PMHx of B-Cell Lymphoma currently undergoing treatment at [facility] [place], HTN, DM presents with fever, chills, and cough x1 week admitted with sepsis and hypoxic resp failure 2/2 to COVID 19 infection
65 yo male from home, ambulates independently with PMHx of B[place]Cell Lymph[place] [facility] [place], DM presents with fever, chills, and cough x1 week admitted with sepsis and hypoxic resp failure 2/2 to COVID 19 infection
64M from home, ambulates independently with PMHx of B-Cell Lymphoma formerly on Rituxan follows at [facility], HTN, DM2 presents with fever, chills, and cough x1 week admitted with sepsis and hypoxic resp failure 2/2 to COVID 19 infection (tested positive at [facility])

## 2020-10-26 ENCOUNTER — TRANSCRIPTION ENCOUNTER (OUTPATIENT)
Age: 65
End: 2020-10-26

## 2021-07-09 NOTE — PATIENT PROFILE ADULT - DISASTER - PRO INTERPRETER NEED 2
English Patient would like to return to work tomorrow on 7/10/21. No sluggishness noted per patient nor wife. Verbal ok received from pcp. Patient notified letter at  for .

## 2021-11-24 NOTE — ED PROVIDER NOTE - TIMING
gradual onset Otolaryngologist Procedure Text (A): After obtaining clear surgical margins the patient was sent to otolaryngology for surgical repair.  The patient understands they will receive post-surgical care and follow-up from the referring physician's office.

## 2022-03-31 RX ORDER — RITUXIMAB 10 MG/ML
20 INJECTION, SOLUTION INTRAVENOUS
Qty: 0 | Refills: 0 | DISCHARGE

## 2022-03-31 RX ORDER — OLMESARTAN MEDOXOMIL 5 MG/1
1 TABLET, FILM COATED ORAL
Qty: 0 | Refills: 0 | DISCHARGE

## 2022-03-31 RX ORDER — METFORMIN HYDROCHLORIDE 850 MG/1
1 TABLET ORAL
Qty: 0 | Refills: 0 | DISCHARGE

## 2025-03-03 NOTE — ED ADULT NURSE NOTE - NS_NURSE_BED_LOCATION_ED_ALL_ED
Called pt to remind her about upcoming eval and to check in at 11:10 to fill out ppw. Reminded her to bring insurance card & ID.    No